# Patient Record
Sex: MALE | Race: WHITE | NOT HISPANIC OR LATINO | Employment: FULL TIME | URBAN - METROPOLITAN AREA
[De-identification: names, ages, dates, MRNs, and addresses within clinical notes are randomized per-mention and may not be internally consistent; named-entity substitution may affect disease eponyms.]

---

## 2017-02-14 ENCOUNTER — ALLSCRIPTS OFFICE VISIT (OUTPATIENT)
Dept: OTHER | Facility: OTHER | Age: 57
End: 2017-02-14

## 2017-02-14 DIAGNOSIS — K58.9 IRRITABLE BOWEL SYNDROME WITHOUT DIARRHEA: ICD-10-CM

## 2017-02-14 DIAGNOSIS — Z12.5 ENCOUNTER FOR SCREENING FOR MALIGNANT NEOPLASM OF PROSTATE: ICD-10-CM

## 2017-02-14 DIAGNOSIS — R73.01 IMPAIRED FASTING GLUCOSE: ICD-10-CM

## 2017-02-14 DIAGNOSIS — E78.5 HYPERLIPIDEMIA: ICD-10-CM

## 2017-02-14 DIAGNOSIS — F42.9 OBSESSIVE-COMPULSIVE DISORDER: ICD-10-CM

## 2017-02-14 DIAGNOSIS — H49.11 FOURTH NERVE PALSY OF RIGHT EYE: ICD-10-CM

## 2017-02-15 ENCOUNTER — GENERIC CONVERSION - ENCOUNTER (OUTPATIENT)
Dept: OTHER | Facility: OTHER | Age: 57
End: 2017-02-15

## 2017-02-15 ENCOUNTER — APPOINTMENT (OUTPATIENT)
Dept: LAB | Facility: CLINIC | Age: 57
End: 2017-02-15
Payer: COMMERCIAL

## 2017-02-15 DIAGNOSIS — R73.01 IMPAIRED FASTING GLUCOSE: ICD-10-CM

## 2017-02-15 DIAGNOSIS — K58.9 IRRITABLE BOWEL SYNDROME WITHOUT DIARRHEA: ICD-10-CM

## 2017-02-15 DIAGNOSIS — H49.11 FOURTH NERVE PALSY OF RIGHT EYE: ICD-10-CM

## 2017-02-15 DIAGNOSIS — Z12.5 ENCOUNTER FOR SCREENING FOR MALIGNANT NEOPLASM OF PROSTATE: ICD-10-CM

## 2017-02-15 DIAGNOSIS — E78.5 HYPERLIPIDEMIA: ICD-10-CM

## 2017-02-15 DIAGNOSIS — F42.9 OBSESSIVE-COMPULSIVE DISORDER: ICD-10-CM

## 2017-02-15 LAB
ALBUMIN SERPL BCP-MCNC: 3.6 G/DL (ref 3.5–5)
ALP SERPL-CCNC: 85 U/L (ref 46–116)
ALT SERPL W P-5'-P-CCNC: 39 U/L (ref 12–78)
ANION GAP SERPL CALCULATED.3IONS-SCNC: 8 MMOL/L (ref 4–13)
AST SERPL W P-5'-P-CCNC: 14 U/L (ref 5–45)
BASOPHILS # BLD AUTO: 0.02 THOUSANDS/ΜL (ref 0–0.1)
BASOPHILS NFR BLD AUTO: 0 % (ref 0–1)
BILIRUB SERPL-MCNC: 0.5 MG/DL (ref 0.2–1)
BUN SERPL-MCNC: 21 MG/DL (ref 5–25)
CALCIUM SERPL-MCNC: 8.5 MG/DL (ref 8.3–10.1)
CHLORIDE SERPL-SCNC: 105 MMOL/L (ref 100–108)
CHOLEST SERPL-MCNC: 167 MG/DL (ref 50–200)
CO2 SERPL-SCNC: 26 MMOL/L (ref 21–32)
CREAT SERPL-MCNC: 1.1 MG/DL (ref 0.6–1.3)
EOSINOPHIL # BLD AUTO: 0.29 THOUSAND/ΜL (ref 0–0.61)
EOSINOPHIL NFR BLD AUTO: 4 % (ref 0–6)
ERYTHROCYTE [DISTWIDTH] IN BLOOD BY AUTOMATED COUNT: 13.1 % (ref 11.6–15.1)
EST. AVERAGE GLUCOSE BLD GHB EST-MCNC: 123 MG/DL
GFR SERPL CREATININE-BSD FRML MDRD: >60 ML/MIN/1.73SQ M
GLUCOSE SERPL-MCNC: 115 MG/DL (ref 65–140)
HBA1C MFR BLD: 5.9 % (ref 4.2–6.3)
HCT VFR BLD AUTO: 44 % (ref 36.5–49.3)
HDLC SERPL-MCNC: 58 MG/DL (ref 40–60)
HGB BLD-MCNC: 15.2 G/DL (ref 12–17)
LDLC SERPL CALC-MCNC: 82 MG/DL (ref 0–100)
LYMPHOCYTES # BLD AUTO: 1.81 THOUSANDS/ΜL (ref 0.6–4.47)
LYMPHOCYTES NFR BLD AUTO: 28 % (ref 14–44)
MCH RBC QN AUTO: 30.2 PG (ref 26.8–34.3)
MCHC RBC AUTO-ENTMCNC: 34.5 G/DL (ref 31.4–37.4)
MCV RBC AUTO: 88 FL (ref 82–98)
MONOCYTES # BLD AUTO: 0.64 THOUSAND/ΜL (ref 0.17–1.22)
MONOCYTES NFR BLD AUTO: 10 % (ref 4–12)
NEUTROPHILS # BLD AUTO: 3.78 THOUSANDS/ΜL (ref 1.85–7.62)
NEUTS SEG NFR BLD AUTO: 58 % (ref 43–75)
NRBC BLD AUTO-RTO: 0 /100 WBCS
PLATELET # BLD AUTO: 253 THOUSANDS/UL (ref 149–390)
PMV BLD AUTO: 10.1 FL (ref 8.9–12.7)
POTASSIUM SERPL-SCNC: 3.9 MMOL/L (ref 3.5–5.3)
PROT SERPL-MCNC: 7.1 G/DL (ref 6.4–8.2)
PSA SERPL-MCNC: 1.4 NG/ML (ref 0–4)
RBC # BLD AUTO: 5.03 MILLION/UL (ref 3.88–5.62)
SODIUM SERPL-SCNC: 139 MMOL/L (ref 136–145)
TRIGL SERPL-MCNC: 137 MG/DL
TSH SERPL DL<=0.05 MIU/L-ACNC: 1.19 UIU/ML (ref 0.36–3.74)
WBC # BLD AUTO: 6.59 THOUSAND/UL (ref 4.31–10.16)

## 2017-02-15 PROCEDURE — 83036 HEMOGLOBIN GLYCOSYLATED A1C: CPT

## 2017-02-15 PROCEDURE — G0103 PSA SCREENING: HCPCS

## 2017-02-15 PROCEDURE — 84443 ASSAY THYROID STIM HORMONE: CPT

## 2017-02-15 PROCEDURE — 36415 COLL VENOUS BLD VENIPUNCTURE: CPT

## 2017-02-15 PROCEDURE — 80053 COMPREHEN METABOLIC PANEL: CPT

## 2017-02-15 PROCEDURE — 80061 LIPID PANEL: CPT

## 2017-02-15 PROCEDURE — 85025 COMPLETE CBC W/AUTO DIFF WBC: CPT

## 2017-02-21 ENCOUNTER — GENERIC CONVERSION - ENCOUNTER (OUTPATIENT)
Dept: OTHER | Facility: OTHER | Age: 57
End: 2017-02-21

## 2017-03-24 ENCOUNTER — ANESTHESIA EVENT (OUTPATIENT)
Dept: GASTROENTEROLOGY | Facility: HOSPITAL | Age: 57
End: 2017-03-24
Payer: COMMERCIAL

## 2017-03-25 ENCOUNTER — GENERIC CONVERSION - ENCOUNTER (OUTPATIENT)
Dept: OTHER | Facility: OTHER | Age: 57
End: 2017-03-25

## 2017-03-25 ENCOUNTER — HOSPITAL ENCOUNTER (OUTPATIENT)
Facility: HOSPITAL | Age: 57
Setting detail: OUTPATIENT SURGERY
Discharge: HOME | End: 2017-03-25
Attending: INTERNAL MEDICINE | Admitting: INTERNAL MEDICINE
Payer: COMMERCIAL

## 2017-03-25 ENCOUNTER — ANESTHESIA (OUTPATIENT)
Dept: GASTROENTEROLOGY | Facility: HOSPITAL | Age: 57
End: 2017-03-25
Payer: COMMERCIAL

## 2017-03-25 VITALS
SYSTOLIC BLOOD PRESSURE: 121 MMHG | OXYGEN SATURATION: 100 % | BODY MASS INDEX: 37.71 KG/M2 | HEIGHT: 64 IN | HEART RATE: 78 BPM | DIASTOLIC BLOOD PRESSURE: 82 MMHG | RESPIRATION RATE: 18 BRPM | WEIGHT: 220.9 LBS | TEMPERATURE: 97.1 F

## 2017-03-25 RX ORDER — TOPIRAMATE 25 MG/1
50 TABLET ORAL DAILY
COMMUNITY
End: 2021-06-30 | Stop reason: HOSPADM

## 2017-03-25 RX ORDER — ATORVASTATIN CALCIUM 20 MG/1
20 TABLET, FILM COATED ORAL DAILY
COMMUNITY
End: 2018-01-30 | Stop reason: SDUPTHER

## 2017-03-25 RX ORDER — SODIUM CHLORIDE, SODIUM LACTATE, POTASSIUM CHLORIDE, CALCIUM CHLORIDE 600; 310; 30; 20 MG/100ML; MG/100ML; MG/100ML; MG/100ML
100 INJECTION, SOLUTION INTRAVENOUS CONTINUOUS
Status: DISCONTINUED | OUTPATIENT
Start: 2017-03-25 | End: 2017-03-25 | Stop reason: HOSPADM

## 2017-03-25 RX ORDER — ASPIRIN 81 MG/1
81 TABLET ORAL DAILY
Status: ON HOLD | COMMUNITY
End: 2021-06-29 | Stop reason: ALTCHOICE

## 2017-03-25 RX ORDER — PROPOFOL 10 MG/ML
INJECTION, EMULSION INTRAVENOUS CONTINUOUS PRN
Status: DISCONTINUED | OUTPATIENT
Start: 2017-03-25 | End: 2017-03-25 | Stop reason: SURG

## 2017-03-25 RX ORDER — FLUOXETINE HYDROCHLORIDE 20 MG/1
20 CAPSULE ORAL DAILY
COMMUNITY
End: 2018-01-30 | Stop reason: SDUPTHER

## 2017-03-25 RX ORDER — LIDOCAINE HYDROCHLORIDE 10 MG/ML
INJECTION, SOLUTION INFILTRATION; PERINEURAL AS NEEDED
Status: DISCONTINUED | OUTPATIENT
Start: 2017-03-25 | End: 2017-03-25 | Stop reason: SURG

## 2017-03-25 RX ORDER — PHENTERMINE HYDROCHLORIDE 37.5 MG/1
37.5 CAPSULE ORAL EVERY MORNING
COMMUNITY
End: 2021-06-30 | Stop reason: HOSPADM

## 2017-03-25 RX ORDER — PROPOFOL 10 MG/ML
INJECTION, EMULSION INTRAVENOUS AS NEEDED
Status: DISCONTINUED | OUTPATIENT
Start: 2017-03-25 | End: 2017-03-25 | Stop reason: SURG

## 2017-03-25 RX ADMIN — LIDOCAINE HYDROCHLORIDE 20 MG: 10 INJECTION, SOLUTION INFILTRATION; PERINEURAL at 11:30

## 2017-03-25 RX ADMIN — SODIUM CHLORIDE, POTASSIUM CHLORIDE, SODIUM LACTATE AND CALCIUM CHLORIDE 100 ML/HR: 600; 310; 30; 20 INJECTION, SOLUTION INTRAVENOUS at 11:10

## 2017-03-25 RX ADMIN — PROPOFOL 100 MG: 10 INJECTION, EMULSION INTRAVENOUS at 11:30

## 2017-03-25 RX ADMIN — PROPOFOL 20 MG: 10 INJECTION, EMULSION INTRAVENOUS at 11:35

## 2017-03-25 RX ADMIN — PROPOFOL 80 MCG/KG/MIN: 10 INJECTION, EMULSION INTRAVENOUS at 11:30

## 2017-07-04 ENCOUNTER — OFFICE VISIT (OUTPATIENT)
Dept: URGENT CARE | Facility: MEDICAL CENTER | Age: 57
End: 2017-07-04
Payer: COMMERCIAL

## 2017-07-04 PROCEDURE — 99213 OFFICE O/P EST LOW 20 MIN: CPT

## 2018-01-10 NOTE — PROGRESS NOTES
Assessment    1  Encounter for preventive health examination (V70 0) (Z00 00)    Plan  CN IV nerve palsy, right eye, Hyperlipidemia, Impaired fasting glucose, Irritable bowel  syndrome, Obsessive compulsive disorder, Screening for prostate cancer    · (1) CBC/PLT/DIFF; Status:Active; Requested for:16Vnn4556;    · (1) COMPREHENSIVE METABOLIC PANEL; Status:Active; Requested for:21Ick4991;    · (1) HEMOGLOBIN A1C; Status:Active; Requested for:51Nsl5424;    · (1) LIPID PANEL, FASTING; Status:Active; Requested for:44Osk4562;    · (1) PSA (SCREEN) (Dx V76 44 Screen for Prostate Cancer); Status:Active; Requested  for:05Gek2185;    · (1) TSH; Status:Active; Requested for:23Qha1629;   Screening for colon cancer    · COLONOSCOPY; Status:Active; Requested for:67Koz2737;     Discussion/Summary  Impression: health maintenance visit  Currently, he eats an adequate diet and has an inadequate exercise regimen  Prostate cancer screening: PSA was ordered  Colorectal cancer screening: colonoscopy has been ordered  Screening lab work includes glucose and lipid profile  The risks and benefits of immunizations were discussed  He was advised to be evaluated by an ophthalmologist  Advice and education were given regarding nutrition, aerobic exercise, weight loss and cardiovascular risk reduction  Repeat labs  monitor BPs  follow up with weight management clinic  referral for colonoscopy  OV one year  History of Present Illness  HM, Adult Male: The patient is being seen for a health maintenance evaluation  The last health maintenance visit was >1 year(s) ago  Social History: Household members include spouse and adult children  He is   Work status: working full time  The patient has never smoked cigarettes  He reports occasional alcohol use  General Health: The patient's health since the last visit is described as good  He has regular dental visits  He denies vision problems   Vision care includes wearing glasses and an eye examination within the last year  He denies hearing loss  Lifestyle:  He consumes a diverse and healthy diet  He has weight concerns  He does not exercise regularly  He does not use tobacco    Screening: Prostate cancer screening includes last prostate-specific antigen testing 2013  Colorectal cancer screening includes no previous screening  Metabolic screening includes lipid profile performed within the past five years, glucose screening performed last year and thyroid function test performed last year  Cardiovascular risk factors: high LDL cholesterol and obesity  HPI: 64year old male here for wellness exam  active problems and PMH see chart  medications reviewed  labs 06/2014 see note  hyperlipidemia on Atorvastatin 20 mg daily  lipid profile 06/2014 cholesterol 155  TGs 119  HDL 46  LDL 85  [place] LFTs normal  impaired fasting glucose 06/2014   he is being followed by weight loss center  he has been started on Metformin along with Phentermine and generic Topamax  labs and abdominal u/s last year results not available  Review of Systems    Constitutional: recent 9 lb weight loss  Eyes: eyesight problems and right CN IV palsy  ophthalmology evaluation  MRI head no abnormalities  Lyme's titer normal  myasthenia testing negative symptoms improved  he is wearing prism lenses in his glasses  Cardiovascular: no chest pain, no palpitations and no extremity edema  Respiratory: no cough, no orthopnea, no shortness of breath during exertion and no PND  Gastrointestinal: no prior colonoscopy, but no abdominal pain, no nausea, no vomiting, no constipation, no diarrhea and no blood in stools  Genitourinary: 12/2013 0 7 , but no urinary hesitancy and no nocturia  Musculoskeletal: no arthralgias and no myalgias  Integumentary: no rashes and no skin lesions  Neurological: no headache and no dizziness  Psychiatric: OCD stable on Fluoxetine  Active Problems    1   CN IV nerve palsy, right eye (378 53) (H49 11)   2  Hyperlipidemia (272 4) (E78 5)   3  Impaired fasting glucose (790 21) (R73 01)   4  Irritable bowel syndrome (564 1) (K58 9)   5  Obesity (278 00) (E66 9)   6  Obsessive compulsive disorder (300 3) (F42 9)   7  Screening for prostate cancer (V76 44) (Z12 5)    Past Medical History    · History of Atypical chest pain (786 59) (R07 89)   · History of Cough (786 2) (R05)    Surgical History    · History of Cardiac Cath Procedure Summary    Family History  Mother    · Family history of Cardiomyopathy   · Family history of Hypertension (V17 49)  Sister    · Family history of Congenital Heart Defect  Brother    · Family history of Coronary Artery Disease (V17 49)  Family History    · Family history of Coronary Artery Disease (V17 49)    Social History    · Never smoked cigarettes (V49 89) (Z78 9)    Current Meds   1  Aspirin Low Strength 81 MG Oral Tablet Chewable; Therapy: (Recorded:21Nov2015) to Recorded   2  Atorvastatin Calcium 20 MG Oral Tablet; Take 1 tablet daily; Therapy: 21ATH9016 to (Last Rx:04Feb2017)  Requested for: 94XLH5694 Ordered   3  Atrovent HFA 17 MCG/ACT Inhalation Aerosol Solution; INHALE 2 PUFFS TWICE DAILY; Therapy: 49QZO9755 to (Last Rx:18Jun2016)  Requested for: 77SUH9923 Ordered   4  Benzonatate 200 MG Oral Capsule; TAKE 1 CAPSULE 3 TIMES DAILY AS NEEDED; Therapy: 51RZT0290 to (Evaluate:28Jun2016)  Requested for: 57SKE8975; Last   Rx:18Jun2016 Ordered   5  Flonase Allergy Relief 50 MCG/ACT Nasal Suspension; USE 1 SPRAY IN EACH   NOSTRIL TWICE DAILY; Therapy: 43RHF7315 to (Last Rx:21Nov2015)  Requested for: 21Nov2015 Ordered   6  FLUoxetine HCl - 20 MG Oral Capsule; take 3 capsules daily; Therapy: 02ERP4332 to (Last Rx:04Feb2017)  Requested for: 13CNK2952 Ordered   7  MetFORMIN HCl - 500 MG Oral Tablet; 1 tablet at bedtime Recorded   8  Phentermine HCl - 37 5 MG Oral Tablet; 1 TABLET DAILY Recorded   9  Topiramate 25 MG Oral Tablet;  Take 1 tablet twice daily Recorded    Allergies    1  No Known Drug Allergies    Vitals   Recorded: 05QAC5100 10:00AM   Temperature 97 6 F   Heart Rate 90   Respiration 16   Systolic 080   Diastolic 82   Height 5 ft 4 in   Weight 221 lb    BMI Calculated 37 93   BSA Calculated 2 05     Physical Exam    Constitutional   General appearance: No acute distress, well appearing and well nourished  Eyes   Conjunctiva and lids: No erythema, swelling or discharge  right CN IV palsy  Pupils and irises: Equal, round, reactive to light  Ophthalmoscopic examination: Normal fundi and optic discs  Ears, Nose, Mouth, and Throat   Otoscopic examination: Tympanic membranes translucent with normal light reflex  Canals patent without erythema  Oropharynx: Normal with no erythema, edema, exudate or lesions  Neck   Neck: Supple, symmetric, trachea midline, no masses  Thyroid: Normal, no thyromegaly  There were no thyroid nodules  Pulmonary   Auscultation of lungs: Clear to auscultation  Cardiovascular   Auscultation of heart: Normal rate and rhythm, normal S1 and S2, no murmurs  Heart sounds: no gallop heard  Carotid pulses: 2+ bilaterally  no bruit heard over the right carotid and no bruit heard over the left carotid  Abdominal aorta: Normal   Abdominal aorta: no bruit heard  Examination of extremities for edema and/or varicosities: Normal     Abdomen   Abdomen: Non-tender, no masses  Liver and spleen: No hepatomegaly or splenomegaly  Lymphatic   Palpation of lymph nodes in neck: No lymphadenopathy  no anterior cervical node enlargement, no posterior cervical node enlargement, no submandibular node enlargement and no supraclavicular node enlargement  Musculoskeletal   Gait and station: Normal     Skin   Skin and subcutaneous tissue: Normal without rashes or lesions      Psychiatric   Mood and affect: Normal        Results/Data  PHQ-2 Adult Depression Screening 54BMQ5297 10:11AM User, [employer]     Test Name Result Flag Reference   PHQ-2 Adult Depression Score 0     Over the last two weeks, how often have you been bothered by any of the following problems?   Little interest or pleasure in doing things: Not at all - 0  Feeling down, depressed, or hopeless: Not at all - 0   PHQ-2 Adult Depression Screening Negative       (1) CBC/PLT/DIFF 25IYQ1579 08:36AM Noe David     Test Name Result Flag Reference   DIFFERENTIAL METHOD Automated     WBC COUNT 6 05 Thousand/uL  4 31-10 16   RBC COUNT 4 62 Million/uL  3 88-5 62   HEMOGLOBIN 13 6 g/dL  12 0-17 0   HEMATOCRIT 40 8 %  36 5-49 3   MCV 88 fL  82-98   MCH 29 4 pg  26 8-34 3   MCHC 33 3 g/dL  31 4-37 4   RDW 13 7 %  11 6-15 1   MPV 9 5 fL  8 9-12 7   PLATELET COUNT 690 Thousand/uL  149-390   nRBC AUTOMATED 0 /100WBC     NEUTROPHILS RELATIVE PERCENT 51 %  43-75   LYMPHOCYTES RELATIVE PERCENT 35 %  14-44   MONOCYTES RELATIVE PERCENT 9 %  4-12   EOSINOPHILS RELATIVE PERCENT 4 %  0-6   BASOPHILS RELATIVE PERCENT 1 %  0-1   NEUTROPHILS ABSOLUTE COUNT 3 09 Thousand/uL  1 85-7 62   LYMPHOCYTES ABSOLUTE COUNT 2 12 Thousand/uL  0 60-4 47   MONOCYTES ABSOLUTE COUNT 0 54 Thousand/uL  0 17-1 22   EOSINOPHILS ABSOLUTE COUNT 0 24 Thousand/uL  0 00-0 61   BASOPHILS ABSOLUTE COUNT 0 06 Thousand/uL  0 00-0 10     (1) COMPREHENSIVE METABOLIC PANEL 09SRJ8507 45:48WT Noe David     Test Name Result Flag Reference   SODIUM 139 mmol/L  136-145   POTASSIUM 4 2 mmol/L  3 5-5 3   CHLORIDE 104 mmol/L  100-108   CARBON DIOXIDE 26 mmol/L  23-33   ANION GAP (CALC) 9 mmol/L  4-13   BILI, TOTAL 0 45 mg/dL  0 20-1 00   TOTAL PROTEIN 6 9 g/dL  6 4-8 2   ALK PHOSPHATAS 114 U/L     ALT (SGPT) 66 U/L  6-78   AST(SGOT) 30 U/L  0-45   GLUCOSE,RANDM 105 mg/dL     ALBUMIN 3 6 g/dL  3 5-5 0   BLOOD UREA NITROGEN 17 mg/dL  5-25   CALCIUM 9 0 mg/dL  8 3-10 1   CREATININE 0 94 mg/dL  0 60-1 30   eGFR African American >60 ml/min/1 73sqm     eGFR Non-African American >60 ml/min/1 73sqm       (1) LIPID PANEL, FASTING 23OCH6070 08:36AM Tamara Knee     Test Name Result Flag Reference   TRIGLYCERIDES 119 mg/dL     TRIGLYCERIDE:  Normal              <150 mg/dl  Borderline High    150-199 mg/dl  High               200-499 mg/dl  Very High          >499 mg/dl  _______________________________________   CHOLESTEROL 155 mg/dL     CHOLESTEROL:  Desirable        <200 mg/dl  Borderline High  200-239 mg/dl  High             >239 mg/dl  ____________________________________   HDL,DIRECT 46 mg/dL     HDL:  High       >59 mg/dl  Low        <41 mg/dl  ______________________________   LDL CHOLESTEROL CALCULATED 85 mg/dL  0-100   LDL, CALCULATED:  This screening LDL is a calculated result  It does not have the accuracy of the Direct Measured LDL in the  monitoring of patients with hyperlipidemia and/or statin therapy  Direct Measured LDL (Test Code 9980) must be ordered separately in these  patients   ______________________________     (1) PSA (SCREEN) (Dx V76 44 Screen for Prostate Cancer) 60MWP1443 09:17AM Tamara Joana     Test Name Result Flag Reference   PSA 0 7 ng/mL   0-4 0   PSA values from one laboratory may not be comparable to those from  another because of the various testing technologies employed  Additionally, PSA results can not be interpreted as absolute evidence of  the presence or absence of disease  This PSA value was obtained by [employer] Chemiluminometric Immunoassay  Future Appointments    Date/Time Provider Specialty Site   02/15/2018 08:00 AM SUSHILA Perez   Family Medicine [address] [facility]     Signatures   Electronically signed by : SUSHILA Gallardo ; Feb 14 2017 11:01AM EST                       (Author)

## 2018-01-11 NOTE — MISCELLANEOUS
Message     Recorded as Task   Date: 02/17/2017 01:36 PM, Created By: Jovany Madrigal   Task Name: Intake   Assigned To: SHANTA De Santiago,SUMEET TEAM   Regarding Patient: Omar Alvarado, Status: In Progress   Comment:    Vangie Torres - 17 Feb 2017 1:36 PM     TASK CREATED  Date: 2/17/2017  Screened by: Erik Snider    Referring Provider: Dr Wojciech Sims:   Has patient been referred for a routine screening Colonoscopy? Yes   Is the patient over 48years of age? Yes     If the answer is YES to both questions, proceed to the medical questions  Do you have a family history of colon cancer? No    Do you have a personal history of colon polyps? No     Do you have any of the following symptoms? No    Have you had a coronary or vascular stent within the last year? No    Have you had a heart attack or stroke in the last 6 months? No    Have you had intestinal surgery in the last 3 months? No    Do you have problems with:   sleep apnea No  Do you use:   No    Have you been hospitalized in the last Month? No    Have you been diagnosed with a bleeding disorder or anemia? No    Have you had chest pain (angina) or breathing problems  (COPD) in the last 3 months? No    Do you have any difficulty walking up a flight of stairs? No    Have you had Kidney failure or insufficiency? No    Have you had heart valve surgery? No    Are you confined to a wheelchair? No     Do you take,,,,, or other blood thinning medication? No    Have you been diagnosed with Diabetes or are you taking any   Diabetic medications? Yes Ok pt takes Metformin    : If patient answers NO to medical questions, then schedule procedure  If patient answers YES to medical questions, then schedule office appointment  Previous Colonoscopy ( if yes) yes  Date and Facility of last colonoscopy?  27 yrs ago    Patient scheduled for procedure: Arley Zhou   ]  Scheduled by: Ashely Truong   ]  Date: [03/25/2017  ]  Time: [ ]  Provider: [ dr Luis Carlos Lucas  ]  [facility]    Referral Obtained for procedure: Were instructions Mailed? Was the prep sent to Pharmacy? Comments: pt would like to have procedure done at [facility]  Pt can be [phone]   DebMarielos - 17 Feb 2017 3:55 PM     TASK REASSIGNED: Previously Assigned To Ewa Parkinson - 21 Feb 2017 3:36 PM     TASK IN PROGRESS        Active Problems    1  CN IV nerve palsy, right eye (378 53) (H49 11)   2  Hyperlipidemia (272 4) (E78 5)   3  Impaired fasting glucose (790 21) (R73 01)   4  Irritable bowel syndrome (564 1) (K58 9)   5  Obesity (278 00) (E66 9)   6  Obsessive compulsive disorder (300 3) (F42 9)   7  Screening for colon cancer (V76 51) (Z12 11)   8  Screening for prostate cancer (V76 44) (Z12 5)    Current Meds   1  Aspirin Low Strength 81 MG Oral Tablet Chewable; Therapy: (Recorded:21Nov2015) to Recorded   2  Atorvastatin Calcium 20 MG Oral Tablet; Take 1 tablet daily; Therapy: 17NSK8588 to (Last Rx:00Kcg7814)  Requested for: 47VSN3453 Ordered   3  FLUoxetine HCl - 20 MG Oral Capsule; take 3 capsules daily; Therapy: 20CNC9620 to (Last Rx:35Hjh6565)  Requested for: 60QGU5194 Ordered   4  MetFORMIN HCl - 500 MG Oral Tablet; 1 tablet at bedtime Recorded   5  Phentermine HCl - 37 5 MG Oral Tablet; 1 TABLET DAILY Recorded   6  Topiramate 25 MG Oral Tablet; Take 1 tablet twice daily Recorded    Allergies    1   No Known Drug Allergies    Signatures   Electronically signed by : Juanita Garza, ; Feb 21 2017  3:53PM EST                       (Author)

## 2018-01-12 NOTE — RESULT NOTES
Message  Jake Challenger I have enclosed a copy of your recent labs  all results are normal except for fasting blood sugar 115  normal less than 100  borderline 100 to 125  watch diet  Results/Data     (1) LIPID PANEL, FASTING   CHOLESTEROL: 167 mg/dL Reference Range   TRIGLYCERIDES: 137 mg/dL Reference Range <=150  HDL,DIRECT: 58 mg/dL Reference Range 40-60  LDL CHOLESTEROL CALCULATED: 82 mg/dL Reference Range 0-100  (1) CBC/PLT/DIFF   WBC COUNT: 6 59 Thousand/uL Reference Range 4 31-10 16  RBC COUNT: 5 03 Million/uL Reference Range 3 88-5 62  HEMOGLOBIN: 15 2 g/dL Reference Range 12 0-17 0  HEMATOCRIT: 44 0 % Reference Range 36 5-49 3  MCV: 88 fL Reference Range 82-98  MCH: 30 2 pg Reference Range 26 8-34 3  MCHC: 34 5 g/dL Reference Range 31 4-37 4  RDW: 13 1 % Reference Range 11 6-15 1  MPV: 10 1 fL Reference Range 8 9-12 7  PLATELET COUNT: 288 Thousands/uL Reference Range 149-390  nRBC AUTOMATED: 0 /100 WBCs  NEUTROPHILS RELATIVE PERCENT: 58 % Reference Range 43-75  LYMPHOCYTES RELATIVE PERCENT: 28 % Reference Range 14-44  MONOCYTES RELATIVE PERCENT: 10 % Reference Range 4-12  EOSINOPHILS RELATIVE PERCENT: 4 % Reference Range 0-6  BASOPHILS RELATIVE PERCENT: 0 % Reference Range 0-1  NEUTROPHILS ABSOLUTE COUNT: 3 78 Thousands/?L Reference Range 1 85-7 62  LYMPHOCYTES ABSOLUTE COUNT: 1 81 Thousands/?L Reference Range 0 60-4 47  MONOCYTES ABSOLUTE COUNT: 0 64 Thousand/?L Reference Range 0 17-1 22  EOSINOPHILS ABSOLUTE COUNT: 0 29 Thousand/?L Reference Range 0 00-0 61  BASOPHILS ABSOLUTE COUNT: 0 02 Thousands/?L Reference Range 0 00-0 10  (1) PSA (SCREEN) (Dx V76 44 Screen for Prostate Cancer)   PROSTATE SPECIFIC ANTIGEN: 1 4 ng/mL Reference Range 0 0-4 0  (1) HEMOGLOBIN A1C   HEMOGLOBIN A1C: 5 9 % Reference Range 4 2-6 3  EST  AVG   GLUCOSE: 123 mg/dl  (1) COMPREHENSIVE METABOLIC PANEL   SODIUM: 787 mmol/L Reference Range 136-145  POTASSIUM: 3 9 mmol/L Reference Range 3 5-5 3  CHLORIDE: 105 mmol/L Reference Range 100-108  CARBON DIOXIDE: 26 mmol/L Reference Range 21-32  ANION GAP (CALC): 8 mmol/L Reference Range 4-13  BLOOD UREA NITROGEN: 21 mg/dL Reference Range 5-25  CREATININE: 1 10 mg/dL Reference Range 0 60-1 30  GLUCOSE,RANDM: 115 mg/dL Reference Range   CALCIUM: 8 5 mg/dL Reference Range 8 3-10 1  AST(SGOT): 14 U/L Reference Range 5-45  ALT (SGPT): 39 U/L Reference Range 12-78  ALK PHOSPHATAS: 85 U/L Reference Range   TOTAL PROTEIN: 7 1 g/dL Reference Range 6 4-8 2  ALBUMIN: 3 6 g/dL Reference Range 3 5-5 0  BILI, TOTAL: 0 50 mg/dL Reference Range 0 20-1 00  eGFR Non-: >60 0 ml/min/1 73sq m  (1) TSH   TSH: 1 190 uIU/mL Reference Range 0 358-3 740    Signatures   Electronically signed by : SUSHILA Batista ; Feb 15 2017  9:57PM EST                       (Author)

## 2018-01-13 VITALS
TEMPERATURE: 97.6 F | BODY MASS INDEX: 37.73 KG/M2 | RESPIRATION RATE: 16 BRPM | HEIGHT: 64 IN | SYSTOLIC BLOOD PRESSURE: 130 MMHG | DIASTOLIC BLOOD PRESSURE: 82 MMHG | HEART RATE: 90 BPM | WEIGHT: 221 LBS

## 2018-01-18 NOTE — MISCELLANEOUS
Message  Notified patient of positive influenza A test result        Plan  Influenza-like illness    · Benzonatate 200 MG Oral Capsule; TAKE 1 CAPSULE 3 TIMES DAILY AS  NEEDED   · (1) RAPID INFLUENZA SCREEN with reflex to PCR; Status:Resulted - Requires  Verification;   Done: 83QFV4658 10:03AM    Signatures   Electronically signed by : SUMA Christie; Dec 22 2016  2:02PM EST                       (Author)

## 2018-01-19 NOTE — PROGRESS NOTES
Assessment   1  Cellulitis (022 9) (L03 90)    Discussion/Summary   Discussion Summary:    Take antibiotic as directed: eat yogurt to avoid GI upset  for increasing signs of infection  Medication Side Effects Reviewed: Possible side effects of new medications were reviewed with the patient/guardian today  Understands and agrees with treatment plan: The treatment plan was reviewed with the patient/guardian  The patient/guardian understands and agrees with the treatment plan    Counseling Documentation With Imm: The patient was counseled regarding diagnostic results,-- instructions for management,-- risk factor reductions,-- prognosis,-- patient and family education,-- impressions,-- risks and benefits of treatment options,-- importance of compliance with treatment  Follow Up Instructions: Follow Up with your Primary Care Provider in 1-2 days  If your symptoms worsen, go to the nearest [place] [address] Emergency Department  Chief Complaint   1  Skin Wound  Chief Complaint Free Text Note Form: Presents with insect bite right shin for last 2 days  Unsure if it is a tick  Red this am       History of Present Illness   HPI: This is a 65 y/o M c/o abrasion on R LE  Pt repots erythema warmth to touch and tenderness  Pt denies any fever/chills, n/v, d/c    Hospital Based Practices Required Assessment:      Pain Assessment      the patient states they do not have pain  Abuse And Domestic Violence Screen       Yes, the patient is safe at home  -- The patient states no one is hurting them  Depression And Suicide Screen  No, the patient has not had thoughts of hurting themself  No, the patient has not felt depressed in the past 7 days  Prefered Language is  [place]  Primary Language is  English  Readiness To Learn: Receptive  Barriers To Learning: none        Preferred Learning: verbal      Review of Systems   Focused-Male:      Constitutional: no fever or chills, feels well, no tiredness, no recent weight loss or weight gain  ENT: no complaints of earache, no loss of hearing, no nosebleeds or nasal discharge, no sore throat or hoarseness  Cardiovascular: no complaints of slow or fast heart rate, no chest pain, no palpitations, no leg claudication or lower extremity edema  Respiratory: no complaints of shortness of breath, no wheezing or cough, no dyspnea on exertion, no orthopnea or PND  Gastrointestinal: no complaints of abdominal pain, no constipation, no nausea or vomiting, no diarrhea or bloody stools  Genitourinary: no complaints of dysuria or incontinence, no hesitancy, no nocturia, no genital lesion, no inadequacy of penile erection  Musculoskeletal: no complaints of arthralgia, no myalgia, no joint swelling or stiffness, no limb pain or swelling  Integumentary: no complaints of skin rash or lesion, no itching or dry skin, no skin wounds-- and-- as noted in HPI  Neurological: no complaints of headache, no confusion, no numbness or tingling, no dizziness or fainting  Active Problems   1  CN IV nerve palsy, right eye (378 53) (H49 11)   2  Hyperlipidemia (272 4) (E78 5)   3  Impaired fasting glucose (790 21) (R73 01)   4  Irritable bowel syndrome (564 1) (K58 9)   5  Obesity (278 00) (E66 9)   6  Obsessive compulsive disorder (300 3) (F42 9)   7  Screening for colon cancer (V76 51) (Z12 11)   8  Screening for prostate cancer (V76 44) (Z12 5)    Past Medical History   1  History of Atypical chest pain (786 59) (R07 89)   2  History of Cough (786 2) (R05)  Active Problems And Past Medical History Reviewed: The active problems and past medical history were reviewed and updated today  Family History   Mother    1  Family history of Cardiomyopathy   2  Family history of Hypertension (V17 49)  Sister    3  Family history of Congenital Heart Defect  Brother    4  Family history of Coronary Artery Disease (V17 49)  Family History    5  Family history of Coronary Artery Disease (V17 49)  Family History Reviewed: The family history was reviewed and updated today  Social History    · Never smoked cigarettes (V49 89) (Z78 9)  Social History Reviewed: The social history was reviewed and updated today  Surgical History   1  History of Cardiac Cath Procedure Summary  Surgical History Reviewed: The surgical history was reviewed and updated today  Current Meds    1  Aspirin Low Strength 81 MG Oral Tablet Chewable; Therapy: (Recorded:21Nov2015) to Recorded   2  Atorvastatin Calcium 20 MG Oral Tablet; Take 1 tablet daily; Therapy: 67DNH7334 to (Last Rx:02Ysk1514)  Requested for: 33KZM9036 Ordered   3  FLUoxetine HCl - 20 MG Oral Capsule; take 3 capsules daily; Therapy: 23MWY0453 to (Last Rx:22Odn2384)  Requested for: 60CNF7404 Ordered   4  Suprep Bowel Prep Kit 17 5-3 13-1 6 GM/180ML Oral Solution; USE AS DIRECTED; Therapy: 83Eup7159 to (Last Rx:10Nou4941)  Requested for: 17Wxn0689 Ordered  Medication List Reviewed: The medication list was reviewed and updated today  Allergies   1  No Known Drug Allergies    Vitals   Signs   Recorded: 75ZOL3339 09:11AM   Temperature: 98 2 F, Temporal  Heart Rate: 74  Pulse Quality: Normal  Respiration Quality: Normal  Respiration: 20  Systolic: 366, Sitting  Diastolic: 98, Sitting  Weight: 230 lb   BMI Calculated: 39 48  BSA Calculated: 2 08  O2 Saturation: 98, RA    Physical Exam        Constitutional      General appearance: No acute distress, well appearing and well nourished  Skin      Examination of the skin for lesions: Abnormal  -- RLE: eyrthema, edema, warmth to touch  Future Appointments      Date/Time Provider Specialty Site   02/15/2018 08:00 AM SUSHILA Guadarrama   Family Medicine [facility]     Signatures    Electronically signed by : Antonio Shi [facility]; Jan 18 2018  4:58PM EST                       (Author)     Electronically signed by : MK Lubin ; Jan 18 2018  5:40PM EST                       (Co-author)

## 2018-01-30 DIAGNOSIS — F32.A DEPRESSION, UNSPECIFIED DEPRESSION TYPE: ICD-10-CM

## 2018-01-30 DIAGNOSIS — E78.2 MIXED HYPERLIPIDEMIA: Primary | ICD-10-CM

## 2018-01-30 RX ORDER — FLUOXETINE HYDROCHLORIDE 20 MG/1
CAPSULE ORAL
Qty: 270 CAPSULE | Refills: 3 | Status: SHIPPED | OUTPATIENT
Start: 2018-01-30

## 2018-01-30 RX ORDER — ATORVASTATIN CALCIUM 20 MG/1
TABLET, FILM COATED ORAL
Qty: 90 TABLET | Refills: 3 | Status: SHIPPED | OUTPATIENT
Start: 2018-01-30

## 2020-08-11 ENCOUNTER — TELEPHONE (OUTPATIENT)
Dept: NEUROLOGY | Facility: CLINIC | Age: 60
End: 2020-08-11

## 2020-08-11 NOTE — TELEPHONE ENCOUNTER
called pt advised t hat the order that we recived is for [facility] pt needs to get an order for [facility] Neurology

## 2020-08-12 ENCOUNTER — TELEPHONE (OUTPATIENT)
Dept: NEUROLOGY | Facility: CLINIC | Age: 60
End: 2020-08-12

## 2020-08-12 NOTE — TELEPHONE ENCOUNTER
1rst attemped call pt to schedule appr   Amanda Carreno/Motor  [facility] Disorder/[employer] [facility]/ schedule appt with Jenny Chandler

## 2020-09-03 ENCOUNTER — CONSULT (OUTPATIENT)
Dept: NEUROLOGY | Facility: CLINIC | Age: 60
End: 2020-09-03
Payer: COMMERCIAL

## 2020-09-03 VITALS
BODY MASS INDEX: 39.76 KG/M2 | SYSTOLIC BLOOD PRESSURE: 122 MMHG | TEMPERATURE: 98 F | HEIGHT: 63 IN | HEART RATE: 75 BPM | DIASTOLIC BLOOD PRESSURE: 73 MMHG

## 2020-09-03 DIAGNOSIS — F42.9 OBSESSIVE-COMPULSIVE DISORDER, UNSPECIFIED TYPE: ICD-10-CM

## 2020-09-03 DIAGNOSIS — G24.5 BLEPHAROSPASM OF BOTH EYES: Primary | ICD-10-CM

## 2020-09-03 PROCEDURE — 99244 OFF/OP CNSLTJ NEW/EST MOD 40: CPT | Performed by: PSYCHIATRY & NEUROLOGY

## 2020-09-03 RX ORDER — TRIHEXYPHENIDYL HYDROCHLORIDE 2 MG/1
TABLET ORAL
Qty: 60 TABLET | Refills: 2 | Status: SHIPPED | OUTPATIENT
Start: 2020-09-03 | End: 2020-10-22 | Stop reason: SDUPTHER

## 2020-09-03 RX ORDER — OMEPRAZOLE 20 MG/1
CAPSULE, DELAYED RELEASE ORAL AS NEEDED
COMMUNITY
Start: 2020-07-25 | End: 2021-06-30 | Stop reason: HOSPADM

## 2020-09-03 NOTE — PATIENT INSTRUCTIONS
Eye blinking and spasms which clinically appears more like blepharospasm versus late onset motor tic  Will try medication trial with trihexyphenidyl 2mg tab 1/2 tab x 1 day then  1 tab daily x 1 week then 1 tab bid  Call if you develop side effects or inefffective after 4 weeks  Will obtain MRI of the brain to rule out underlying cause and follow up after to discuss and assess response

## 2020-09-03 NOTE — PROGRESS NOTES
Patient ID: Pao Pagan is a 61 y o  male  Assessment/Plan:    Blepharospasm of both eyes  Eye blinking and spasms which clinically appears more like blepharospasm versus late onset motor tic  Will try medication trial with trihexyphenidyl 2mg tab 1/2 tab x 1 day then  1 tab daily x 1 week then 1 tab bid  Call if you develop side effects or inefffective after 4 weeks  Will obtain MRI of the brain to rule out underlying cause and follow up after to discuss and assess response  Prior drumming movement in the setting of recovering from anesthesia  Perhaps stereotypy at the time, related to OCD  Diagnoses and all orders for this visit:    Blepharospasm of both eyes  -     MRI brain with and without contrast; Future  -     trihexyphenidyl (ARTANE) 2 mg tablet; 1/2 tab qam x 1 day then 1 tab daily x 1 week then 1 tab bid    Obsessive-compulsive disorder, unspecified type    Other orders  -     omeprazole (PriLOSEC) 20 mg delayed release capsule; as needed  -     Cyanocobalamin (VITAMIN B 12 PO); Take 1 tablet by mouth  -     Multiple Vitamins-Iron (MULTIVITAMIN/IRON PO); Take by mouth 2 (two) times a day  -     Calcium Carb-Cholecalciferol (CALCIUM CARBONATE-VITAMIN D3 PO); Take by mouth Three times a day           time  Subjective:    Mr Shiela Schmid is a man with OCD, hyperlipidema, PTSD, hx of bladder cancer undergoing treatment who is referred to the Movement disorder center for evaluation of frequent blinking tics  He gradually developed frequent eye blinking and spasms in April/May 2020  Overtime this has gradually worsened to the point that is now present much of the day  He underwent a procedure for recent diagnosis of bladder cancer 8/6/2020 and woke up from anesthesia with severe blinking and drumming of his hands on his abdomen  He was given fentanyl and two other medications  Drumming stopped but eye blinking did not improve  He was fully aware and conscious during event   He has good recollection of the events and bought in a video to show me the movements  Blinking is mild in am and worse as the day goes on  He has not tried any medications  He is not aware of any sensory tricks  No photophobia  He denies any premonitory sensation or release with movements  He can suppress blinking for short periods of time if he really tries to concentrate on this  There is no history of frequent blinking or motor tics as a child  Nothing makes it worse or better  It is now moderate in severity and bothersome  Fluoextine for 18 years for PCD has been controlled  Dx in his 29's   He has "night tremors", wife feels him shaking  Present for over a year  No associated incontinence  Left hand tremor on occasion while driving  Family history of "undiagnosed tics" described as some with eye twiching ( cousins and uncle)  The following portions of the patient's history were reviewed and updated as appropriate: allergies, current medications, past family history, past medical history, past social history, past surgical history and problem list          Objective:    Blood pressure 122/73, pulse 75, temperature 98 °F (36 7 °C), height 5' 2 5" (1 588 m)  Physical Exam  Vitals signs reviewed  Eyes:      Pupils: Pupils are equal, round, and reactive to light  Pulmonary:      Effort: Pulmonary effort is normal    Musculoskeletal:      Right lower leg: No edema  Left lower leg: No edema  Neurological:      Deep Tendon Reflexes: Strength normal and reflexes are normal and symmetric  Psychiatric:         Speech: Speech normal          Neurological Exam  Mental Status   Oriented to person, place, time and situation  Recent and remote memory are intact  Speech is normal  Follows complex commands  Attention and concentration are normal     Cranial Nerves  CN II: Visual fields full to confrontation  Right funduscopic exam: not visualized  Left funduscopic exam: not visualized    CN III, IV, VI: Pupils equal round and reactive to light bilaterally  Right CN IV palsy  CN V: Facial sensation is normal   CN VII: Full and symmetric facial movement  CN VIII: Hearing is normal   CN IX, X: Palate elevates symmetrically  CN XI: Shoulder shrug strength is normal   CN XII: Tongue midline without atrophy or fasciculations  Constant blinking  Of the eyes with spasm  Movements can be suppressed for a short periods with effort  No sensory trick  No lower facial movements  Involvement of the obicularis oculi and  muscles       Motor   Normal muscle tone  The following abnormal movements were seen: Strength is 5/5 throughout all four extremities  Sensory  Light touch is normal in upper and lower extremities  Pinprick is normal in upper and lower extremities  Vibration is normal in upper and lower extremities  Reflexes  Deep tendon reflexes are 2+ and symmetric in all four extremities with downgoing toes bilaterally  Coordination  Right: Finger-to-nose normal  Rapid alternating movement normal   Left: Finger-to-nose normal  Rapid alternating movement normal     Gait  Casual gait is normal including stance, stride, and arm swing  Able to rise from chair without using arms  ROS:    Review of Systems   Constitutional: Positive for fatigue (due to Cancer)  Negative for appetite change and fever  HENT: Negative  Negative for hearing loss, tinnitus, trouble swallowing and voice change  Eyes: Negative  Negative for photophobia and pain  Respiratory: Negative  Negative for shortness of breath  Cardiovascular: Negative  Negative for palpitations  Gastrointestinal: Negative  Negative for nausea and vomiting  Endocrine: Negative  Negative for cold intolerance  Genitourinary: Negative  Negative for dysuria, frequency and urgency  Musculoskeletal: Negative  Negative for myalgias and neck pain  Skin: Negative  Negative for rash  Allergic/Immunologic: Negative      Neurological: Positive for tremors  Negative for dizziness, seizures, syncope, facial asymmetry, speech difficulty, weakness, light-headedness, numbness and headaches  Twitching  Convulsions     Hematological: Negative  Does not bruise/bleed easily  Psychiatric/Behavioral: Positive for sleep disturbance (Waking up at night)  Negative for confusion and hallucinations  All other systems reviewed and are negative  Review of system was personally reviewed

## 2020-09-04 PROBLEM — G24.5 BLEPHAROSPASM OF BOTH EYES: Status: ACTIVE | Noted: 2020-09-04

## 2020-09-04 NOTE — ASSESSMENT & PLAN NOTE
Eye blinking and spasms which clinically appears more like blepharospasm versus late onset motor tic  Will try medication trial with trihexyphenidyl 2mg tab 1/2 tab x 1 day then  1 tab daily x 1 week then 1 tab bid  Call if you develop side effects or inefffective after 4 weeks  Will obtain MRI of the brain to rule out underlying cause and follow up after to discuss and assess response  Prior drumming movement in the setting of recovering from anesthesia  Perhaps stereotypy at the time, related to OCD

## 2020-09-27 ENCOUNTER — HOSPITAL ENCOUNTER (OUTPATIENT)
Dept: MRI IMAGING | Facility: HOSPITAL | Age: 60
Discharge: HOME | End: 2020-09-27
Attending: PSYCHIATRY & NEUROLOGY
Payer: COMMERCIAL

## 2020-09-27 DIAGNOSIS — G24.5 BLEPHAROSPASM OF BOTH EYES: ICD-10-CM

## 2020-09-27 PROCEDURE — A9585 GADOBUTROL INJECTION: HCPCS | Performed by: PSYCHIATRY & NEUROLOGY

## 2020-09-27 PROCEDURE — G1004 CDSM NDSC: HCPCS

## 2020-09-27 PROCEDURE — 70553 MRI BRAIN STEM W/O & W/DYE: CPT

## 2020-09-27 RX ADMIN — GADOBUTROL 10 ML: 604.72 INJECTION INTRAVENOUS at 11:35

## 2020-10-20 ENCOUNTER — TELEPHONE (OUTPATIENT)
Dept: NEUROLOGY | Facility: CLINIC | Age: 60
End: 2020-10-20

## 2020-10-22 ENCOUNTER — OFFICE VISIT (OUTPATIENT)
Dept: NEUROLOGY | Facility: CLINIC | Age: 60
End: 2020-10-22
Payer: COMMERCIAL

## 2020-10-22 ENCOUNTER — TELEPHONE (OUTPATIENT)
Dept: NEUROLOGY | Facility: CLINIC | Age: 60
End: 2020-10-22

## 2020-10-22 VITALS
HEART RATE: 70 BPM | TEMPERATURE: 97.8 F | HEIGHT: 63 IN | BODY MASS INDEX: 33.2 KG/M2 | WEIGHT: 187.4 LBS | DIASTOLIC BLOOD PRESSURE: 70 MMHG | SYSTOLIC BLOOD PRESSURE: 118 MMHG

## 2020-10-22 DIAGNOSIS — G24.5 BLEPHAROSPASM OF BOTH EYES: ICD-10-CM

## 2020-10-22 DIAGNOSIS — G24.5 BLEPHAROSPASM OF BOTH EYES: Primary | ICD-10-CM

## 2020-10-22 PROCEDURE — 99214 OFFICE O/P EST MOD 30 MIN: CPT | Performed by: PSYCHIATRY & NEUROLOGY

## 2020-10-22 RX ORDER — TRIHEXYPHENIDYL HYDROCHLORIDE 2 MG/1
2 TABLET ORAL DAILY
Qty: 30 TABLET | Refills: 8 | Status: SHIPPED | OUTPATIENT
Start: 2020-10-22 | End: 2021-04-28 | Stop reason: SINTOL

## 2021-03-10 DIAGNOSIS — Z23 ENCOUNTER FOR IMMUNIZATION: ICD-10-CM

## 2021-04-28 ENCOUNTER — TELEPHONE (OUTPATIENT)
Dept: NEUROLOGY | Facility: CLINIC | Age: 61
End: 2021-04-28

## 2021-04-28 ENCOUNTER — OFFICE VISIT (OUTPATIENT)
Dept: NEUROLOGY | Facility: CLINIC | Age: 61
End: 2021-04-28
Payer: COMMERCIAL

## 2021-04-28 VITALS
SYSTOLIC BLOOD PRESSURE: 122 MMHG | DIASTOLIC BLOOD PRESSURE: 77 MMHG | WEIGHT: 194 LBS | BODY MASS INDEX: 34.92 KG/M2 | HEART RATE: 70 BPM

## 2021-04-28 DIAGNOSIS — G24.5 BLEPHAROSPASM OF BOTH EYES: Primary | ICD-10-CM

## 2021-04-28 PROCEDURE — 99214 OFFICE O/P EST MOD 30 MIN: CPT | Performed by: PSYCHIATRY & NEUROLOGY

## 2021-04-28 NOTE — ASSESSMENT & PLAN NOTE
Eye blinking and spasms with partial improvement on trihexyphenidyl but he was unable to tolerate this  Time spent discussing the treatment of blood for spasm  We discussed the potential use of neurotoxin injections such as Botox or Xeomin  He was treatment will last about 3 months at a time  We would start off with lower doses with plans to increase if ineffective  Potential risks and benefits discussed  Here is aware of potential excessive weakness, infection, or localized bleeding  He wishes to proceed so will have him return for injections once approved

## 2021-04-28 NOTE — PROGRESS NOTES
Patient ID: Amy Rivas is a 61 y o  male  Assessment/Plan:    Blepharospasm of both eyes  Eye blinking and spasms with partial improvement on trihexyphenidyl but he was unable to tolerate this  Time spent discussing the treatment of blood for spasm  We discussed the potential use of neurotoxin injections such as Botox or Xeomin  He was treatment will last about 3 months at a time  We would start off with lower doses with plans to increase if ineffective  Potential risks and benefits discussed  Here is aware of potential excessive weakness, infection, or localized bleeding  He wishes to proceed so will have him return for injections once approved  Diagnoses and all orders for this visit:    Blepharospasm of both eyes           Subjective:       Mr Jasmine Ryder is a man with OCD, hyperlipidema, PTSD, hx of bladder cancer undergoing treatment who presents for Movement disorder follow up for eye blinking and spasms which clinically appears more like blepharospasm versus late onset motor tic  To review, he gradually developed frequent eye blinking and spasms in April/May 2020 which worsened to the point of being present much of the day  Fluoextine for 18 years for OCD has been controlled  Dx in his 29's  He has "night tremors" since 2019  Left hand tremor on occasion while driving  Family history of "undiagnosed tics" described as some with eye twiching ( cousins and uncle)  He can suppress blinking for short periods of time if he really tries to concentrate on this  There is no history of frequent blinking or motor tics as a child  No sensory tricks  No photophobia  He denies any premonitory sensation or release with movements  Seen 9/3/20 and started on trihexyphenidyl with improvement (bid dosing caused him to feel "foggy")  MRI of the brain with very mild chronic mcirangiopathy  He discontinued trihexyphenidyl due to cognitive fog affecting his ability to concentrate at work     He continues to have constant eye blinking which is bothersome  This is worse in certain lights  Opening eye wide or rubbing the eye helps  No clear sensory tricks  Drops for dry eye did not help  He does note that 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  They resume once he stops  Objective:    Blood pressure 122/77, pulse 70, weight 88 kg (194 lb)  Physical Exam  Eyes:      Extraocular Movements: Extraocular movements intact  Pupils: Pupils are equal, round, and reactive to light  Neurological:      Mental Status: He is alert  Deep Tendon Reflexes: Strength normal    Psychiatric:         Speech: Speech normal          Neurological Exam  Mental Status  Alert  Oriented only to person, place and situation  Recent and remote memory are intact  Speech is normal  Follows complex commands  Attention and concentration are normal     Cranial Nerves  CN II: Right funduscopic exam: not visualized  Left funduscopic exam: not visualized  CN III, IV, VI: Extraocular movements intact bilaterally  Pupils equal round and reactive to light bilaterally  CN VII: Full and symmetric facial movement  CN VIII: Hearing is normal   CN IX, X: Palate elevates symmetrically  CN XI: Shoulder shrug strength is normal   CN XII: Tongue midline without atrophy or fasciculations  Motor   Normal muscle tone  Frequent eye blinking of both eye  Constant , dampening only during eye exam  No sensory trick  No additional facial movement    Strength is 5/5 throughout all four extremities  Sensory  Light touch is normal in upper and lower extremities  Coordination  Right: Finger-to-nose normal   Left: Finger-to-nose normal     Gait  Casual gait is normal including stance, stride, and arm swing  Able to rise from chair without using arms  ROS:    Review of Systems    Constitutional: Negative  Negative for appetite change and fever  HENT: Negative   Negative for hearing loss, tinnitus, trouble swallowing and voice change  Eyes: Negative for photophobia and pain  Blinking is not as sustained as it was   Respiratory: Negative  Negative for shortness of breath  Cardiovascular: Negative  Negative for palpitations  Gastrointestinal: Negative  Negative for nausea and vomiting  Endocrine: Negative  Negative for cold intolerance  Genitourinary: Negative  Negative for dysuria, frequency and urgency  Musculoskeletal: Negative  Negative for myalgias and neck pain  Skin: Negative  Negative for rash  Allergic/Immunologic: Negative  Neurological: Negative  Negative for dizziness, tremors, seizures, syncope, facial asymmetry, speech difficulty, weakness, light-headedness, numbness and headaches  Hematological: Negative  Does not bruise/bleed easily  Psychiatric/Behavioral: Negative  Negative for confusion, hallucinations and sleep disturbance  All other systems reviewed and are negative  Review of system was personally reviewed

## 2021-04-28 NOTE — PROGRESS NOTES
Review of Systems   Constitutional: Negative  Negative for appetite change and fever  HENT: Negative  Negative for hearing loss, tinnitus, trouble swallowing and voice change  Eyes: Negative for photophobia and pain  Blinking is not as sustained as it was     Respiratory: Negative  Negative for shortness of breath  Cardiovascular: Negative  Negative for palpitations  Gastrointestinal: Negative  Negative for nausea and vomiting  Endocrine: Negative  Negative for cold intolerance  Genitourinary: Negative  Negative for dysuria, frequency and urgency  Musculoskeletal: Negative  Negative for myalgias and neck pain  Skin: Negative  Negative for rash  Allergic/Immunologic: Negative  Neurological: Negative  Negative for dizziness, tremors, seizures, syncope, facial asymmetry, speech difficulty, weakness, light-headedness, numbness and headaches  Hematological: Negative  Does not bruise/bleed easily  Psychiatric/Behavioral: Negative  Negative for confusion, hallucinations and sleep disturbance  All other systems reviewed and are negative

## 2021-04-28 NOTE — TELEPHONE ENCOUNTER
Botox authorization has been submitted to [employer] via [employer] on 4/28/2021  Will await an approval/denial letter      Pending authorization #: [id number]

## 2021-04-30 NOTE — TELEPHONE ENCOUNTER
Per [employer] via [employer]- patient's Botox authorization is still pending  Will check back in next week to check on the status

## 2021-05-03 NOTE — TELEPHONE ENCOUNTER
Reed,    Please reach out to the patient and schedule a New Start Botox with Dr Manolo June  It will be specialty pharmacy   Please let me know once the patient is scheduled so I can attach a referral     Thank you,    Gissel Sheppard

## 2021-05-03 NOTE — TELEPHONE ENCOUNTER
Received an approval for the patient's Botox authorization from [employer] via [employer]  Approval letter will be received via fax  Approval letter from portal will be scanned into the patient's chart under "media" for future reference      Authorization information:    Authorization #: [id number]  Valid dates: 4/28/2021 until 4/28/2022- valid for 4 visits (400 units total)  Please use [facility]

## 2021-05-03 NOTE — TELEPHONE ENCOUNTER
Noted thank you- a referral has been attached to the patient's upcoming Botox appointment on 5/19/2021 with Dr Jolie Fraga in the [facility]  Type Date User Summary Attachment   General 05/03/2021  9:54 AM Juanis Hernandez care coordination  -   Note    Botox- authorization #: [id number]- valid for 4 visits- from 4/28/2021 until 4/28/2022   Please use [facility] specialty pharmacy         *Patient is a New Start Botox*     Thank you,     Elias Coyle

## 2021-05-03 NOTE — TELEPHONE ENCOUNTER
[address]- spoke with Kacy Reynolds- I advised her I was calling to enroll the patient with the specialty pharmacy  I provided her with the patent's demographic, insurance and contact information  She was able to get me over to a pharmacist to provide a verbal Rx  Spoke with Orly Gonzalez, pharmacist- provided a verbal Rx for Botox 100 units QTY: 1 under [facility] with diagnosis: G24 5 with 3 refills  Will do a status check in 2 days         Radu,    Please follow-up on this order in 2 days    Thank you    Elias Coyle

## 2021-05-06 NOTE — TELEPHONE ENCOUNTER
[address] spoke with Bar Guzman, advised I was calling to check the status of patient's botox order  Bar Guzman states that patient's botox order is still in insurance verification, asked Bar Guzman to aby patient's order as STAT   Will call for a status check in 2 days

## 2021-05-10 NOTE — TELEPHONE ENCOUNTER
Called [facility] specialty pharmacy spoke with Fredy, advised I was calling to check the status of patient's botox order  Fredy states that patient's profile is still in insurance verification, she states it is in [facility], she states there are notes from 5/7/21 to change fill to 30 day supply   Will call for a status check in 2 days and will send email to Timothy Bliss to help expedite

## 2021-05-11 NOTE — TELEPHONE ENCOUNTER
Called [facility] specialty pharmacy spoke with Augustina Denver, advised I was calling to check the status of patient's botox order  Augustina Denver states that patient's order is ready to schedule for delivery and confirmed patient's consent is on file  Botox is scheduled for delivery Wednesday 5/12/2021 to the [facility] via [employer] [facility]    Reed, please await patient's botox delivery and document once received, please advise if medication is not received by 2pm     Thanks  Jacinda Webb

## 2021-05-12 NOTE — TELEPHONE ENCOUNTER
Botox number of units: 100 Units  Botox quantity: One  Arrived at what location: [place]  Lot number: [id number]  Expiration Date: 10/2023  Appt notes indicate correct medication: Yes

## 2021-05-19 ENCOUNTER — PROCEDURE VISIT (OUTPATIENT)
Dept: NEUROLOGY | Facility: CLINIC | Age: 61
End: 2021-05-19
Payer: COMMERCIAL

## 2021-05-19 ENCOUNTER — TELEPHONE (OUTPATIENT)
Dept: NEUROLOGY | Facility: CLINIC | Age: 61
End: 2021-05-19

## 2021-05-19 VITALS — TEMPERATURE: 98.9 F | DIASTOLIC BLOOD PRESSURE: 61 MMHG | SYSTOLIC BLOOD PRESSURE: 117 MMHG | HEART RATE: 62 BPM

## 2021-05-19 DIAGNOSIS — G24.5 BLEPHAROSPASM OF BOTH EYES: Primary | ICD-10-CM

## 2021-05-19 PROCEDURE — 64612 DESTROY NERVE FACE MUSCLE: CPT | Performed by: PSYCHIATRY & NEUROLOGY

## 2021-05-19 NOTE — PROGRESS NOTES
Universal Protocol   Consent: Written consent obtained  Consent given by: patient        Chemodenervation     Date/Time 5/19/2021 11:25 AM     Performed by  Mirella Agrawal MD     Authorized by Mirella Agrawal MD        Pre-procedure details      Prepped With: Alcohol     Anesthesia  (see MAR for exact dosages): Anesthesia method:  None   Procedure details     Position:  Supine   Botox     Brand:  Botox    mL's of preservative free sterile saline:  4     Final Concentration per CC:  25 units (may change next visit to 2cc per 100 ml)    Needle gauge: 30G  Procedures     Botox Procedures: blepharospasm      Indications: blepharospasm      Last date: first injections  Injection Location      Head / Face:  R orbicularis oculi, L orbicularis oculi, R  and L     L  injection amount:  1 25 unit(s)    R  injection amount:  1 25 unit(s)    L orbicularis oculi injection amount:  6 25 unit(s)    R orbicularis oculi injection amount:  6 25 unit(s)    Comments:  1 25 units x 5 injections in each eye (medial upper and lower canthus, outer upper and lower canthus, lateral canthus)   Total Units     Total units discarded:  85   Post-procedure details      Chemodenervation:  Head or face    Facial Nerve Location[de-identified]  Bilateral facial nerve    Patient tolerance of procedure: Tolerated well, no immediate complications   Comments      Mr Georgiana Rahman 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 who present for Botox injections  To review, he gradually developed frequent eye blinking and spasms in April/May 2020 which worsened to the point of being present much of the day  Fluoextine for 18 years for OCD has been controlled  Dx in his 29's  He has "night tremors" since 2019  Left hand tremor on occasion while driving  Family history of "undiagnosed tics" described as some with eye twiching ( cousins and uncle)    He can suppress blinking for short periods of time if he really tries to concentrate on this  There is no history of frequent blinking or motor tics as a child  No sensory tricks  No photophobia  He denies any premonitory sensation or release with movements  Trial of  trihexyphenidyl with improvement but bid dosing caused him to feel "foggy"    MRI of the brain with very mild chronic mcirangiopathy       Total  Injected: 15 units

## 2021-06-01 ENCOUNTER — PATIENT MESSAGE (OUTPATIENT)
Dept: NEUROLOGY | Facility: CLINIC | Age: 61
End: 2021-06-01

## 2021-06-02 NOTE — TELEPHONE ENCOUNTER
From: Moise Godinez  To: Casandra Wood MD  Sent: 6/1/2021 3:44 PM EDT  Subject: Visit Follow-Up Question    Dr José Manuel Henning    I received a bill for my last visit for $1,000  This is covered by my insurance  I am attaching my insurance card so that your office can submit it to the insurance company for payment      Thank you

## 2021-06-18 ENCOUNTER — TELEPHONE (OUTPATIENT)
Dept: NEUROLOGY | Facility: CLINIC | Age: 61
End: 2021-06-18

## 2021-06-18 NOTE — TELEPHONE ENCOUNTER
Noted, will attach referral and order medication closer to patient's appointment in a separate encounter

## 2021-06-18 NOTE — TELEPHONE ENCOUNTER
Called pt and informed him that we do have an opening in Dr Marly Elliott schedule and we are looking to schedule his next [facility]  I offered an appt date on 08/25/2021 in the [facility] location at the time of 11:00 AM  Patient agreed and asked for a call in case we can get him in earlier in the morning on that date

## 2021-06-27 ENCOUNTER — HOSPITAL ENCOUNTER (OUTPATIENT)
Facility: HOSPITAL | Age: 61
Setting detail: OBSERVATION
Discharge: HOME | End: 2021-06-30
Attending: EMERGENCY MEDICINE | Admitting: INTERNAL MEDICINE
Payer: COMMERCIAL

## 2021-06-27 ENCOUNTER — APPOINTMENT (EMERGENCY)
Dept: CT IMAGING | Facility: HOSPITAL | Age: 61
End: 2021-06-27
Payer: COMMERCIAL

## 2021-06-27 DIAGNOSIS — R10.9 INTRACTABLE ABDOMINAL PAIN: ICD-10-CM

## 2021-06-27 DIAGNOSIS — R55 SYNCOPE: Primary | ICD-10-CM

## 2021-06-27 DIAGNOSIS — K52.9 COLITIS: ICD-10-CM

## 2021-06-27 DIAGNOSIS — R19.7 DIARRHEA: ICD-10-CM

## 2021-06-27 DIAGNOSIS — R10.30 LOWER ABDOMINAL PAIN: ICD-10-CM

## 2021-06-27 DIAGNOSIS — K22.10 BARRETT'S ESOPHAGEAL ULCERATION: ICD-10-CM

## 2021-06-27 PROBLEM — Z98.890 S/P GASTRIC SURGERY: Status: ACTIVE | Noted: 2021-06-27

## 2021-06-27 PROBLEM — E11.9 TYPE 2 DIABETES MELLITUS, WITHOUT LONG-TERM CURRENT USE OF INSULIN (HCC): Status: ACTIVE | Noted: 2021-06-27

## 2021-06-27 PROBLEM — R79.89 ELEVATED LACTIC ACID LEVEL: Status: ACTIVE | Noted: 2021-06-27

## 2021-06-27 LAB
ALBUMIN SERPL BCP-MCNC: 3.7 G/DL (ref 3.5–5)
ALP SERPL-CCNC: 90 U/L (ref 46–116)
ALT SERPL W P-5'-P-CCNC: 33 U/L (ref 12–78)
ANION GAP SERPL CALCULATED.3IONS-SCNC: 7 MMOL/L (ref 4–13)
AST SERPL W P-5'-P-CCNC: 17 U/L (ref 5–45)
ATRIAL RATE: 65 BPM
BACTERIA UR QL AUTO: NORMAL /HPF
BASOPHILS # BLD AUTO: 0.04 THOUSANDS/ΜL (ref 0–0.1)
BASOPHILS NFR BLD AUTO: 0 % (ref 0–1)
BILIRUB SERPL-MCNC: 0.43 MG/DL (ref 0.2–1)
BILIRUB UR QL STRIP: NEGATIVE
BUN SERPL-MCNC: 21 MG/DL (ref 5–25)
CALCIUM SERPL-MCNC: 9.4 MG/DL (ref 8.3–10.1)
CHLORIDE SERPL-SCNC: 103 MMOL/L (ref 100–108)
CLARITY UR: CLEAR
CO2 SERPL-SCNC: 29 MMOL/L (ref 21–32)
COLOR UR: YELLOW
CREAT SERPL-MCNC: 1.22 MG/DL (ref 0.6–1.3)
EOSINOPHIL # BLD AUTO: 0.14 THOUSAND/ΜL (ref 0–0.61)
EOSINOPHIL NFR BLD AUTO: 1 % (ref 0–6)
ERYTHROCYTE [DISTWIDTH] IN BLOOD BY AUTOMATED COUNT: 12.5 % (ref 11.6–15.1)
GFR SERPL CREATININE-BSD FRML MDRD: 64 ML/MIN/1.73SQ M
GLUCOSE SERPL-MCNC: 187 MG/DL (ref 65–140)
GLUCOSE UR STRIP-MCNC: NEGATIVE MG/DL
HCT VFR BLD AUTO: 42.8 % (ref 36.5–49.3)
HGB BLD-MCNC: 14.4 G/DL (ref 12–17)
HGB UR QL STRIP.AUTO: ABNORMAL
IMM GRANULOCYTES # BLD AUTO: 0.05 THOUSAND/UL (ref 0–0.2)
IMM GRANULOCYTES NFR BLD AUTO: 1 % (ref 0–2)
KETONES UR STRIP-MCNC: NEGATIVE MG/DL
LACTATE SERPL-SCNC: 0.9 MMOL/L (ref 0.5–2)
LACTATE SERPL-SCNC: 2.3 MMOL/L (ref 0.5–2)
LEUKOCYTE ESTERASE UR QL STRIP: NEGATIVE
LIPASE SERPL-CCNC: 595 U/L (ref 73–393)
LYMPHOCYTES # BLD AUTO: 1.78 THOUSANDS/ΜL (ref 0.6–4.47)
LYMPHOCYTES NFR BLD AUTO: 18 % (ref 14–44)
MAGNESIUM SERPL-MCNC: 2.1 MG/DL (ref 1.6–2.6)
MCH RBC QN AUTO: 30.5 PG (ref 26.8–34.3)
MCHC RBC AUTO-ENTMCNC: 33.6 G/DL (ref 31.4–37.4)
MCV RBC AUTO: 91 FL (ref 82–98)
MONOCYTES # BLD AUTO: 0.79 THOUSAND/ΜL (ref 0.17–1.22)
MONOCYTES NFR BLD AUTO: 8 % (ref 4–12)
NEUTROPHILS # BLD AUTO: 6.9 THOUSANDS/ΜL (ref 1.85–7.62)
NEUTS SEG NFR BLD AUTO: 72 % (ref 43–75)
NITRITE UR QL STRIP: NEGATIVE
NON-SQ EPI CELLS URNS QL MICRO: NORMAL /HPF
NRBC BLD AUTO-RTO: 0 /100 WBCS
P AXIS: 43 DEGREES
PH UR STRIP.AUTO: 6.5 [PH]
PLATELET # BLD AUTO: 239 THOUSANDS/UL (ref 149–390)
PMV BLD AUTO: 9.7 FL (ref 8.9–12.7)
POTASSIUM SERPL-SCNC: 3.9 MMOL/L (ref 3.5–5.3)
PR INTERVAL: 152 MS
PROT SERPL-MCNC: 7.2 G/DL (ref 6.4–8.2)
PROT UR STRIP-MCNC: NEGATIVE MG/DL
QRS AXIS: 59 DEGREES
QRSD INTERVAL: 82 MS
QT INTERVAL: 394 MS
QTC INTERVAL: 409 MS
RBC # BLD AUTO: 4.72 MILLION/UL (ref 3.88–5.62)
RBC #/AREA URNS AUTO: NORMAL /HPF
SODIUM SERPL-SCNC: 139 MMOL/L (ref 136–145)
SP GR UR STRIP.AUTO: 1.01 (ref 1–1.03)
T WAVE AXIS: 21 DEGREES
TRIGL SERPL-MCNC: 136 MG/DL
TROPONIN I SERPL-MCNC: <0.02 NG/ML
TROPONIN I SERPL-MCNC: <0.02 NG/ML
UROBILINOGEN UR QL STRIP.AUTO: 0.2 E.U./DL
VENTRICULAR RATE: 65 BPM
WBC # BLD AUTO: 9.7 THOUSAND/UL (ref 4.31–10.16)
WBC #/AREA URNS AUTO: NORMAL /HPF

## 2021-06-27 PROCEDURE — 36415 COLL VENOUS BLD VENIPUNCTURE: CPT | Performed by: EMERGENCY MEDICINE

## 2021-06-27 PROCEDURE — 81001 URINALYSIS AUTO W/SCOPE: CPT | Performed by: EMERGENCY MEDICINE

## 2021-06-27 PROCEDURE — 83735 ASSAY OF MAGNESIUM: CPT | Performed by: EMERGENCY MEDICINE

## 2021-06-27 PROCEDURE — 99219 PR INITIAL OBSERVATION CARE/DAY 50 MINUTES: CPT | Performed by: NURSE PRACTITIONER

## 2021-06-27 PROCEDURE — 87040 BLOOD CULTURE FOR BACTERIA: CPT | Performed by: EMERGENCY MEDICINE

## 2021-06-27 PROCEDURE — 74174 CTA ABD&PLVS W/CONTRAST: CPT

## 2021-06-27 PROCEDURE — 96361 HYDRATE IV INFUSION ADD-ON: CPT

## 2021-06-27 PROCEDURE — 93005 ELECTROCARDIOGRAM TRACING: CPT

## 2021-06-27 PROCEDURE — 96376 TX/PRO/DX INJ SAME DRUG ADON: CPT

## 2021-06-27 PROCEDURE — 84478 ASSAY OF TRIGLYCERIDES: CPT | Performed by: NURSE PRACTITIONER

## 2021-06-27 PROCEDURE — 85025 COMPLETE CBC W/AUTO DIFF WBC: CPT | Performed by: EMERGENCY MEDICINE

## 2021-06-27 PROCEDURE — 71275 CT ANGIOGRAPHY CHEST: CPT

## 2021-06-27 PROCEDURE — 99285 EMERGENCY DEPT VISIT HI MDM: CPT

## 2021-06-27 PROCEDURE — 80053 COMPREHEN METABOLIC PANEL: CPT | Performed by: EMERGENCY MEDICINE

## 2021-06-27 PROCEDURE — 96375 TX/PRO/DX INJ NEW DRUG ADDON: CPT

## 2021-06-27 PROCEDURE — 84484 ASSAY OF TROPONIN QUANT: CPT | Performed by: EMERGENCY MEDICINE

## 2021-06-27 PROCEDURE — 84484 ASSAY OF TROPONIN QUANT: CPT | Performed by: NURSE PRACTITIONER

## 2021-06-27 PROCEDURE — 99285 EMERGENCY DEPT VISIT HI MDM: CPT | Performed by: EMERGENCY MEDICINE

## 2021-06-27 PROCEDURE — 83605 ASSAY OF LACTIC ACID: CPT | Performed by: EMERGENCY MEDICINE

## 2021-06-27 PROCEDURE — 96374 THER/PROPH/DIAG INJ IV PUSH: CPT

## 2021-06-27 PROCEDURE — 93010 ELECTROCARDIOGRAM REPORT: CPT | Performed by: INTERNAL MEDICINE

## 2021-06-27 PROCEDURE — C9113 INJ PANTOPRAZOLE SODIUM, VIA: HCPCS | Performed by: NURSE PRACTITIONER

## 2021-06-27 PROCEDURE — 83690 ASSAY OF LIPASE: CPT | Performed by: EMERGENCY MEDICINE

## 2021-06-27 RX ORDER — OXYCODONE HYDROCHLORIDE 5 MG/1
5 TABLET ORAL EVERY 4 HOURS PRN
Status: DISCONTINUED | OUTPATIENT
Start: 2021-06-27 | End: 2021-06-30 | Stop reason: HOSPADM

## 2021-06-27 RX ORDER — SODIUM CHLORIDE, SODIUM LACTATE, POTASSIUM CHLORIDE, CALCIUM CHLORIDE 600; 310; 30; 20 MG/100ML; MG/100ML; MG/100ML; MG/100ML
125 INJECTION, SOLUTION INTRAVENOUS CONTINUOUS
Status: DISCONTINUED | OUTPATIENT
Start: 2021-06-27 | End: 2021-06-28

## 2021-06-27 RX ORDER — HYDROMORPHONE HCL/PF 1 MG/ML
0.2 SYRINGE (ML) INJECTION EVERY 4 HOURS PRN
Status: DISCONTINUED | OUTPATIENT
Start: 2021-06-27 | End: 2021-06-30 | Stop reason: HOSPADM

## 2021-06-27 RX ORDER — PANTOPRAZOLE SODIUM 40 MG/1
40 INJECTION, POWDER, FOR SOLUTION INTRAVENOUS ONCE
Status: COMPLETED | OUTPATIENT
Start: 2021-06-27 | End: 2021-06-27

## 2021-06-27 RX ORDER — HYDROMORPHONE HCL/PF 1 MG/ML
1 SYRINGE (ML) INJECTION ONCE
Status: COMPLETED | OUTPATIENT
Start: 2021-06-27 | End: 2021-06-27

## 2021-06-27 RX ORDER — PANTOPRAZOLE SODIUM 40 MG/1
40 TABLET, DELAYED RELEASE ORAL
Status: DISCONTINUED | OUTPATIENT
Start: 2021-06-28 | End: 2021-06-29

## 2021-06-27 RX ORDER — ONDANSETRON 2 MG/ML
4 INJECTION INTRAMUSCULAR; INTRAVENOUS ONCE
Status: COMPLETED | OUTPATIENT
Start: 2021-06-27 | End: 2021-06-27

## 2021-06-27 RX ORDER — MAGNESIUM HYDROXIDE/ALUMINUM HYDROXICE/SIMETHICONE 120; 1200; 1200 MG/30ML; MG/30ML; MG/30ML
30 SUSPENSION ORAL EVERY 6 HOURS PRN
Status: DISCONTINUED | OUTPATIENT
Start: 2021-06-27 | End: 2021-06-30 | Stop reason: HOSPADM

## 2021-06-27 RX ORDER — B-COMPLEX WITH VITAMIN C
1 TABLET ORAL 2 TIMES DAILY WITH MEALS
Status: DISCONTINUED | OUTPATIENT
Start: 2021-06-28 | End: 2021-06-30 | Stop reason: HOSPADM

## 2021-06-27 RX ORDER — FLUOXETINE HYDROCHLORIDE 20 MG/1
60 CAPSULE ORAL DAILY
Status: DISCONTINUED | OUTPATIENT
Start: 2021-06-28 | End: 2021-06-28

## 2021-06-27 RX ORDER — ONDANSETRON 2 MG/ML
4 INJECTION INTRAMUSCULAR; INTRAVENOUS EVERY 6 HOURS PRN
Status: DISCONTINUED | OUTPATIENT
Start: 2021-06-27 | End: 2021-06-30 | Stop reason: HOSPADM

## 2021-06-27 RX ORDER — ATORVASTATIN CALCIUM 20 MG/1
20 TABLET, FILM COATED ORAL DAILY
Status: DISCONTINUED | OUTPATIENT
Start: 2021-06-28 | End: 2021-06-28

## 2021-06-27 RX ORDER — HYDROMORPHONE HCL/PF 1 MG/ML
0.5 SYRINGE (ML) INJECTION ONCE
Status: COMPLETED | OUTPATIENT
Start: 2021-06-27 | End: 2021-06-27

## 2021-06-27 RX ORDER — OXYCODONE HYDROCHLORIDE 5 MG/1
2.5 TABLET ORAL EVERY 4 HOURS PRN
Status: DISCONTINUED | OUTPATIENT
Start: 2021-06-27 | End: 2021-06-30 | Stop reason: HOSPADM

## 2021-06-27 RX ADMIN — ONDANSETRON 4 MG: 2 INJECTION INTRAMUSCULAR; INTRAVENOUS at 23:55

## 2021-06-27 RX ADMIN — SODIUM CHLORIDE, SODIUM LACTATE, POTASSIUM CHLORIDE, AND CALCIUM CHLORIDE 125 ML/HR: .6; .31; .03; .02 INJECTION, SOLUTION INTRAVENOUS at 22:34

## 2021-06-27 RX ADMIN — OXYCODONE HYDROCHLORIDE 5 MG: 5 TABLET ORAL at 23:52

## 2021-06-27 RX ADMIN — SODIUM CHLORIDE 1000 ML: 0.9 INJECTION, SOLUTION INTRAVENOUS at 18:49

## 2021-06-27 RX ADMIN — ONDANSETRON 4 MG: 2 INJECTION INTRAMUSCULAR; INTRAVENOUS at 18:40

## 2021-06-27 RX ADMIN — IOHEXOL 100 ML: 350 INJECTION, SOLUTION INTRAVENOUS at 18:54

## 2021-06-27 RX ADMIN — HYDROMORPHONE HYDROCHLORIDE 0.5 MG: 1 INJECTION, SOLUTION INTRAMUSCULAR; INTRAVENOUS; SUBCUTANEOUS at 18:40

## 2021-06-27 RX ADMIN — HYDROMORPHONE HYDROCHLORIDE 1 MG: 1 INJECTION, SOLUTION INTRAMUSCULAR; INTRAVENOUS; SUBCUTANEOUS at 20:21

## 2021-06-27 RX ADMIN — PIPERACILLIN AND TAZOBACTAM 3.38 G: 36; 4.5 INJECTION, POWDER, FOR SOLUTION INTRAVENOUS at 21:34

## 2021-06-27 RX ADMIN — PANTOPRAZOLE SODIUM 40 MG: 40 INJECTION, POWDER, FOR SOLUTION INTRAVENOUS at 22:37

## 2021-06-27 NOTE — ED PROVIDER NOTES
History  Chief Complaint   Patient presents with    Abdominal Pain     pt arrives by EMS after near syncope  pt states he was laying down when suddenly he had cold sweats and almosted blacked out  pt states afterwards he had sudden onset of abd and lower back pain  49-year-old male patient presents the ED for evaluation of syncopal event, states he suddenly passed out then had sudden onset of generalized abdominal discomfort with radiation to the back  No vomiting or diarrhea  No prior episodes of anything similar  He had 1 prior surgery to his abdomen, hernia repair  No recent surgeries  He states the pain is a 10/10, difficult to pinpoint  History provided by:  Patient   used: No    Abdominal Pain  Pain location:  Generalized      Prior to Admission Medications   Prescriptions Last Dose Informant Patient Reported? Taking?    Calcium Carb-Cholecalciferol (CALCIUM CARBONATE-VITAMIN D3 PO)   Yes No   Sig: Take by mouth Three times a day   Cyanocobalamin (VITAMIN B 12 PO)   Yes No   Sig: Take 1 tablet by mouth   FLUoxetine (PROzac) 20 mg capsule   No No   Sig: TAKE 3 CAPSULES DAILY   Multiple Vitamins-Iron (MULTIVITAMIN/IRON PO)   Yes No   Sig: Take by mouth 2 (two) times a day   aspirin (ECOTRIN LOW STRENGTH) 81 mg EC tablet  Self Yes No   Sig: Take 81 mg by mouth daily   atorvastatin (LIPITOR) 20 mg tablet   No No   Sig: TAKE 1 TABLET DAILY   metFORMIN (GLUCOPHAGE) 500 mg tablet  Self Yes No   Sig: Take 500 mg by mouth daily with breakfast   omeprazole (PriLOSEC) 20 mg delayed release capsule   Yes No   Sig: as needed   phentermine 37 5 MG capsule  Self Yes No   Sig: Take 37 5 mg by mouth every morning   topiramate (TOPAMAX) 25 mg tablet   Yes No   Sig: Take 50 mg by mouth daily      Facility-Administered Medications: None       Past Medical History:   Diagnosis Date    Atypical chest pain     Bladder cancer ([facility] [address] )     CN IV nerve palsy, right eye     Diabetes mellitus ([facility] [address] )  Hyperlipidemia     IFG (impaired fasting glucose)     OCD (obsessive compulsive disorder)     Snorings        Past Surgical History:   Procedure Laterality Date    CARDIAC CATHETERIZATION      normal coronary arteries    COLONOSCOPY      HAND SURGERY Left     HERNIA REPAIR      AR COLONOSCOPY FLX DX W/COLLJ SPEC WHEN PFRMD N/A 3/25/2017    Procedure: COLONOSCOPY;  Surgeon: Dante Prabhakar MD;  [facility]; Service: Gastroenterology    TONSILLECTOMY      WISDOM TOOTH EXTRACTION         Family History   Problem Relation Age of Onset    Cardiomyopathy Mother     Hypertension Mother     Congenital heart disease Sister     Coronary artery disease Brother     Coronary artery disease Family      I have reviewed and agree with the history as documented  E-Cigarette/Vaping    E-Cigarette Use Never User      E-Cigarette/Vaping Substances     Social History     Tobacco Use    Smoking status: Former Smoker     Packs/day: 3 00     Types: Cigarettes     Quit date:      Years since quittin 5    Smokeless tobacco: Never Used   Vaping Use    Vaping Use: Never used   Substance Use Topics    Alcohol use: No    Drug use: No       Review of Systems   Gastrointestinal: Positive for abdominal pain  All other systems reviewed and are negative  Physical Exam  Physical Exam  Vitals and nursing note reviewed  Constitutional:       Appearance: He is well-developed and normal weight  HENT:      Head: Normocephalic and atraumatic  Mouth/Throat:      Mouth: Mucous membranes are moist    Eyes:      General: No scleral icterus  Conjunctiva/sclera: Conjunctivae normal    Cardiovascular:      Rate and Rhythm: Normal rate and regular rhythm  Heart sounds: Normal heart sounds  No murmur heard  Pulmonary:      Effort: Pulmonary effort is normal  No respiratory distress  Breath sounds: Normal breath sounds  Abdominal:      General: Abdomen is flat   Bowel sounds are normal  There is no distension  Palpations: Abdomen is soft  Tenderness: There is generalized abdominal tenderness  There is no right CVA tenderness, left CVA tenderness, guarding or rebound  Negative signs include Trevino's sign and Rovsing's sign  Hernia: No hernia is present  Musculoskeletal:      Cervical back: Neck supple  Skin:     General: Skin is warm and dry  Capillary Refill: Capillary refill takes less than 2 seconds  Neurological:      General: No focal deficit present  Mental Status: He is alert     Psychiatric:         Mood and Affect: Mood normal          Behavior: Behavior normal          Vital Signs  ED Triage Vitals   Temperature Pulse Respirations Blood Pressure SpO2   06/27/21 1811 06/27/21 1808 06/27/21 1808 06/27/21 1808 06/27/21 1808   98 1 °F (36 7 °C) 63 18 (!) 171/90 98 %      Temp Source Heart Rate Source Patient Position - Orthostatic VS BP Location FiO2 (%)   06/27/21 1811 06/27/21 1808 06/27/21 1808 06/27/21 1930 --   Oral Monitor Lying Left arm       Pain Score       06/27/21 1840       Worst Possible Pain           Vitals:    06/27/21 1808 06/27/21 1930 06/27/21 2030 06/27/21 2313   BP: (!) 171/90 136/75 129/60 122/68   Pulse: 63 76 74 67   Patient Position - Orthostatic VS: Lying Lying Lying Lying         Visual Acuity  Visual Acuity      Most Recent Value   L Pupil Size (mm)  3  (Pended)    R Pupil Size (mm)  3  (Pended)    L Pupil Shape  Round  (Pended)    R Pupil Shape  Round  (Pended)           ED Medications  Medications   calcium carbonate-vitamin D (OSCAL-D) 500 mg-200 units per tablet 1 tablet (has no administration in time range)   pantoprazole (PROTONIX) EC tablet 40 mg (has no administration in time range)   lactated ringers infusion (125 mL/hr Intravenous New Bag 6/28/21 0004)   ondansetron (ZOFRAN) injection 4 mg (has no administration in time range)   aluminum-magnesium hydroxide-simethicone (MYLANTA) oral suspension 30 mL (has no administration in time range)   oxyCODONE (ROXICODONE) IR tablet 2 5 mg ( Oral See Alternative 6/27/21 2352)     Or   oxyCODONE (ROXICODONE) IR tablet 5 mg (5 mg Oral Given 6/27/21 2352)   HYDROmorphone (DILAUDID) injection 0 2 mg (has no administration in time range)   multivitamin stress formula tablet 1 tablet (has no administration in time range)   atorvastatin (LIPITOR) tablet 20 mg (20 mg Oral Given 6/28/21 0007)   FLUoxetine (PROzac) capsule 60 mg (60 mg Oral Given 6/28/21 0007)   ondansetron (ZOFRAN) injection 4 mg (4 mg Intravenous Given 6/27/21 1840)   HYDROmorphone (DILAUDID) injection 0 5 mg (0 5 mg Intravenous Given 6/27/21 1840)   sodium chloride 0 9 % bolus 1,000 mL (0 mL Intravenous Stopped 6/27/21 2017)   iohexol (OMNIPAQUE) 350 MG/ML injection (SINGLE-DOSE) 100 mL (100 mL Intravenous Given 6/27/21 1854)   HYDROmorphone (DILAUDID) injection 1 mg (1 mg Intravenous Given 6/27/21 2021)   piperacillin-tazobactam (ZOSYN) 3 375 g in sodium chloride 0 9 % 100 mL IVPB (0 g Intravenous Stopped 6/27/21 2232)   pantoprazole (PROTONIX) injection 40 mg (40 mg Intravenous Given 6/27/21 2237)       Diagnostic Studies  Results Reviewed     Procedure Component Value Units Date/Time    Troponin I [[id number]]     Lab Status: No result Specimen: Blood     Blood culture #1 [[id number]] Collected: 06/27/21 1848    Lab Status: Preliminary result Specimen: Blood from Arm, Right Updated: 06/28/21 0001     Blood Culture Received in Microbiology Lab  Culture in Progress  Blood culture #2 [[id number]] Collected: 06/27/21 1848    Lab Status: Preliminary result Specimen: Blood from Arm, Right Updated: 06/28/21 0001     Blood Culture Received in Microbiology Lab  Culture in Progress      Lactic acid 2 Hours [[id number]]  (Normal) Collected: 06/27/21 2134    Lab Status: Final result Specimen: Blood from Arm, Right Updated: 06/27/21 2310     LACTIC ACID 0 9 mmol/L     Narrative:      Result may be elevated if tourniquet was used during collection  Troponin I [[id number]]     Lab Status: No result Specimen: Blood     Troponin I [[id number]]  (Normal) Collected: 06/27/21 2232    Lab Status: Final result Specimen: Blood from Arm, Right Updated: 06/27/21 2257     Troponin I <0 02 ng/mL     Triglycerides [[id number]]  (Normal) Collected: 06/27/21 1848    Lab Status: Final result Specimen: Blood from Arm, Right Updated: 06/27/21 2241     Triglycerides 136 mg/dL     Clostridium difficile toxin by PCR with EIA [[id number]]     Lab Status: No result Specimen: Stool from Per Rectum     Urine Microscopic [[id number]]  (Normal) Collected: 06/27/21 2018    Lab Status: Final result Specimen: Urine, Clean Catch Updated: 06/27/21 2053     RBC, UA 2-4 /hpf      WBC, UA 1-2 /hpf      Epithelial Cells None Seen /hpf      Bacteria, UA Occasional /hpf     UA w Reflex to Microscopic w Reflex to Culture [[id number]]  (Abnormal) Collected: 06/27/21 2018    Lab Status: Final result Specimen: Urine, Clean Catch Updated: 06/27/21 2031     Color, UA Yellow     Clarity, UA Clear     Specific Gravity, UA 1 010     pH, UA 6 5     Leukocytes, UA Negative     Nitrite, UA Negative     Protein, UA Negative mg/dl      Glucose, UA Negative mg/dl      Ketones, UA Negative mg/dl      Urobilinogen, UA 0 2 E U /dl      Bilirubin, UA Negative     Blood, UA Small    Lactic acid, plasma [[id number]]  (Abnormal) Collected: 06/27/21 1848    Lab Status: Final result Specimen: Blood from Arm, Right Updated: 06/27/21 1925     LACTIC ACID 2 3 mmol/L     Narrative:      Result may be elevated if tourniquet was used during collection      Troponin I [[id number]]  (Normal) Collected: 06/27/21 1848    Lab Status: Final result Specimen: Blood from Arm, Right Updated: 06/27/21 1921     Troponin I <0 02 ng/mL     CMP [[id number]]  (Abnormal) Collected: 06/27/21 1848    Lab Status: Final result Specimen: Blood from Arm, Right Updated: 06/27/21 1920     Sodium 139 mmol/L      Potassium 3 9 mmol/L Chloride 103 mmol/L      CO2 29 mmol/L      ANION GAP 7 mmol/L      BUN 21 mg/dL      Creatinine 1 22 mg/dL      Glucose 187 mg/dL      Calcium 9 4 mg/dL      AST 17 U/L      ALT 33 U/L      Alkaline Phosphatase 90 U/L      Total Protein 7 2 g/dL      Albumin 3 7 g/dL      Total Bilirubin 0 43 mg/dL      eGFR 64 ml/min/1 73sq m     Narrative:      [place] guidelines for Chronic Kidney Disease (CKD):     Stage 1 with normal or high GFR (GFR > 90 mL/min/1 73 square meters)    Stage 2 Mild CKD (GFR = 60-89 mL/min/1 73 square meters)    Stage 3A Moderate CKD (GFR = 45-59 mL/min/1 73 square meters)    Stage 3B Moderate CKD (GFR = 30-44 mL/min/1 73 square meters)    Stage 4 Severe CKD (GFR = 15-29 mL/min/1 73 square meters)    Stage 5 End Stage CKD (GFR <15 mL/min/1 73 square meters)  Note: GFR calculation is accurate only with a steady state creatinine    Lipase [[id number]]  (Abnormal) Collected: 06/27/21 1848    Lab Status: Final result Specimen: Blood from Arm, Right Updated: 06/27/21 1920     Lipase 595 u/L     Magnesium [[id number]]  (Normal) Collected: 06/27/21 1848    Lab Status: Final result Specimen: Blood from Arm, Right Updated: 06/27/21 1920     Magnesium 2 1 mg/dL     CBC and differential [[id number]] Collected: 06/27/21 1848    Lab Status: Final result Specimen: Blood from Arm, Right Updated: 06/27/21 1910     WBC 9 70 Thousand/uL      RBC 4 72 Million/uL      Hemoglobin 14 4 g/dL      Hematocrit 42 8 %      MCV 91 fL      MCH 30 5 pg      MCHC 33 6 g/dL      RDW 12 5 %      MPV 9 7 fL      Platelets 640 Thousands/uL      nRBC 0 /100 WBCs      Neutrophils Relative 72 %      Immat GRANS % 1 %      Lymphocytes Relative 18 %      Monocytes Relative 8 %      Eosinophils Relative 1 %      Basophils Relative 0 %      Neutrophils Absolute 6 90 Thousands/µL      Immature Grans Absolute 0 05 Thousand/uL      Lymphocytes Absolute 1 78 Thousands/µL      Monocytes Absolute 0 79 Thousand/µL Eosinophils Absolute 0 14 Thousand/µL      Basophils Absolute 0 04 Thousands/µL                  CTA dissection protocol chest/abdomen/pelvis   Final Result by Mitchell Sanchez MD (06/27 2018)      No evidence of aortic aneurysm or dissection  No intramural hematoma  No infiltrate or pleural effusion  Questionable mild diffuse colonic wall thickening with no significant pericolonic inflammatory stranding which may be secondary to an infectious or inflammatory colitis versus underdistention  The study was limited by the lack of oral contrast      Correlation with patient's symptoms is recommended  No free air or free fluid  Small sliding hiatal hernia  Workstation performed: [id number]                    Procedures  Procedures         ED Course  ED Course as of Jun 28 0041   Sun Jun 27, 2021   257 44-year-old male with severe abdominal pain with syncope  His vital signs are stable however he his pain is severe and out of proportion to exam   Stat CT a chest abdomen pelvis ordered, discussed with CT tech and RN  Medicated with Dilaudid, IV fluids infusing  2144 D/w surgery resident Gita Boast, requested consultation for abdominal pain  Looked at CT scan and states no surgical emergency  [facility] accepts to service under Keo [facility]  [facility]  Number of Diagnoses or Management Options  Colitis: new and requires workup  Diarrhea: new and requires workup  Intractable abdominal pain: new and requires workup  Syncope: new and requires workup  Diagnosis management comments: 44-year-old male with syncopal event and generalized abdominal pain with diarrhea  Stated his pain was severe although exam was mild, tenderness nonspecific and generalized  No peritoneal signs  He states he feels like it radiates the back  Workup showing possible colitis and mesenteric stranding    Of note his lipase is elevated in the 500s, this may be acute pancreatitis versus colitis  Discussed case with General surgery resident who get a CT and recommended no surgical intervention, no surgical problems present  Discussed case with [facility] except service under Dr Kimberli Granados  Patient's pain did improve the ED after IV fluid resuscitation and IV Dilaudid, Zofran  Amount and/or Complexity of Data Reviewed  Clinical lab tests: ordered and reviewed  Tests in the radiology section of CPT®: ordered and reviewed  Discuss the patient with other providers: yes  Independent visualization of images, tracings, or specimens: yes    Risk of Complications, Morbidity, and/or Mortality  Presenting problems: moderate  Diagnostic procedures: moderate  Management options: moderate    Patient Progress  Patient progress: improved      Disposition  Final diagnoses:   Syncope   Intractable abdominal pain   Colitis   Diarrhea     Time reflects when diagnosis was documented in both MDM as applicable and the Disposition within this note     Time User Action Codes Description Comment    6/27/2021  9:45 PM Tyree Ann [R55] Syncope     6/27/2021  9:45 PM Vidya Bays [R10 9] Intractable abdominal pain     6/27/2021  9:45 PM Vidya Bays [K52 9] Colitis     6/27/2021  9:45 PM Tyree Ann [R19 7] Diarrhea       ED Disposition     ED Disposition Condition Date/Time Comment    Admit Stable Sun Jun 27, 2021  9:45 PM Case was discussed with [facility] and the patient's admission status was agreed to be Admission Status: observation status to the service of Dr Kimberli Granados           Follow-up Information    None         Current Discharge Medication List      CONTINUE these medications which have NOT CHANGED    Details   aspirin (ECOTRIN LOW STRENGTH) 81 mg EC tablet Take 81 mg by mouth daily      atorvastatin (LIPITOR) 20 mg tablet TAKE 1 TABLET DAILY  Qty: 90 tablet, Refills: 3    Associated Diagnoses: Mixed hyperlipidemia      Calcium Carb-Cholecalciferol (CALCIUM CARBONATE-VITAMIN D3 PO) Take by mouth Three times a day      Cyanocobalamin (VITAMIN B 12 PO) Take 1 tablet by mouth      FLUoxetine (PROzac) 20 mg capsule TAKE 3 CAPSULES DAILY  Qty: 270 capsule, Refills: 3    Associated Diagnoses: Depression, unspecified depression type      metFORMIN (GLUCOPHAGE) 500 mg tablet Take 500 mg by mouth daily with breakfast      Multiple Vitamins-Iron (MULTIVITAMIN/IRON PO) Take by mouth 2 (two) times a day      omeprazole (PriLOSEC) 20 mg delayed release capsule as needed      phentermine 37 5 MG capsule Take 37 5 mg by mouth every morning      topiramate (TOPAMAX) 25 mg tablet Take 50 mg by mouth daily           No discharge procedures on file      PDMP Review     None          ED Provider  Electronically Signed by           Garry Myers DO  06/28/21 0041

## 2021-06-28 ENCOUNTER — APPOINTMENT (OUTPATIENT)
Dept: ULTRASOUND IMAGING | Facility: HOSPITAL | Age: 61
End: 2021-06-28
Payer: COMMERCIAL

## 2021-06-28 PROBLEM — R79.89 ELEVATED LACTIC ACID LEVEL: Status: RESOLVED | Noted: 2021-06-27 | Resolved: 2021-06-28

## 2021-06-28 LAB
ANION GAP SERPL CALCULATED.3IONS-SCNC: 5 MMOL/L (ref 4–13)
BASOPHILS # BLD AUTO: 0.02 THOUSANDS/ΜL (ref 0–0.1)
BASOPHILS NFR BLD AUTO: 0 % (ref 0–1)
BUN SERPL-MCNC: 17 MG/DL (ref 5–25)
CALCIUM SERPL-MCNC: 8 MG/DL (ref 8.3–10.1)
CHLORIDE SERPL-SCNC: 107 MMOL/L (ref 100–108)
CO2 SERPL-SCNC: 29 MMOL/L (ref 21–32)
CREAT SERPL-MCNC: 1.08 MG/DL (ref 0.6–1.3)
CRP SERPL QL: 4.6 MG/L
EOSINOPHIL # BLD AUTO: 0.09 THOUSAND/ΜL (ref 0–0.61)
EOSINOPHIL NFR BLD AUTO: 1 % (ref 0–6)
ERYTHROCYTE [DISTWIDTH] IN BLOOD BY AUTOMATED COUNT: 12.6 % (ref 11.6–15.1)
GFR SERPL CREATININE-BSD FRML MDRD: 74 ML/MIN/1.73SQ M
GLUCOSE SERPL-MCNC: 103 MG/DL (ref 65–140)
HCT VFR BLD AUTO: 38.4 % (ref 36.5–49.3)
HGB BLD-MCNC: 12.8 G/DL (ref 12–17)
IMM GRANULOCYTES # BLD AUTO: 0.05 THOUSAND/UL (ref 0–0.2)
IMM GRANULOCYTES NFR BLD AUTO: 1 % (ref 0–2)
LIPASE SERPL-CCNC: 39 U/L (ref 73–393)
LYMPHOCYTES # BLD AUTO: 1.74 THOUSANDS/ΜL (ref 0.6–4.47)
LYMPHOCYTES NFR BLD AUTO: 16 % (ref 14–44)
MCH RBC QN AUTO: 30.5 PG (ref 26.8–34.3)
MCHC RBC AUTO-ENTMCNC: 33.3 G/DL (ref 31.4–37.4)
MCV RBC AUTO: 91 FL (ref 82–98)
MONOCYTES # BLD AUTO: 1.01 THOUSAND/ΜL (ref 0.17–1.22)
MONOCYTES NFR BLD AUTO: 9 % (ref 4–12)
NEUTROPHILS # BLD AUTO: 7.89 THOUSANDS/ΜL (ref 1.85–7.62)
NEUTS SEG NFR BLD AUTO: 73 % (ref 43–75)
NRBC BLD AUTO-RTO: 0 /100 WBCS
PLATELET # BLD AUTO: 185 THOUSANDS/UL (ref 149–390)
PMV BLD AUTO: 9.1 FL (ref 8.9–12.7)
POTASSIUM SERPL-SCNC: 3.9 MMOL/L (ref 3.5–5.3)
RBC # BLD AUTO: 4.2 MILLION/UL (ref 3.88–5.62)
SODIUM SERPL-SCNC: 141 MMOL/L (ref 136–145)
TROPONIN I SERPL-MCNC: <0.02 NG/ML
TROPONIN I SERPL-MCNC: <0.02 NG/ML
WBC # BLD AUTO: 10.8 THOUSAND/UL (ref 4.31–10.16)

## 2021-06-28 PROCEDURE — 86140 C-REACTIVE PROTEIN: CPT | Performed by: INTERNAL MEDICINE

## 2021-06-28 PROCEDURE — 83690 ASSAY OF LIPASE: CPT | Performed by: NURSE PRACTITIONER

## 2021-06-28 PROCEDURE — 84484 ASSAY OF TROPONIN QUANT: CPT | Performed by: NURSE PRACTITIONER

## 2021-06-28 PROCEDURE — 76705 ECHO EXAM OF ABDOMEN: CPT

## 2021-06-28 PROCEDURE — 99225 PR SBSQ OBSERVATION CARE/DAY 25 MINUTES: CPT | Performed by: INTERNAL MEDICINE

## 2021-06-28 PROCEDURE — 93005 ELECTROCARDIOGRAM TRACING: CPT

## 2021-06-28 PROCEDURE — 99244 OFF/OP CNSLTJ NEW/EST MOD 40: CPT | Performed by: INTERNAL MEDICINE

## 2021-06-28 PROCEDURE — 85025 COMPLETE CBC W/AUTO DIFF WBC: CPT | Performed by: NURSE PRACTITIONER

## 2021-06-28 PROCEDURE — 80048 BASIC METABOLIC PNL TOTAL CA: CPT | Performed by: NURSE PRACTITIONER

## 2021-06-28 RX ORDER — DICYCLOMINE HCL 20 MG
20 TABLET ORAL
Status: DISCONTINUED | OUTPATIENT
Start: 2021-06-28 | End: 2021-06-30 | Stop reason: HOSPADM

## 2021-06-28 RX ORDER — FLUOXETINE HYDROCHLORIDE 20 MG/1
60 CAPSULE ORAL
Status: DISCONTINUED | OUTPATIENT
Start: 2021-06-28 | End: 2021-06-30 | Stop reason: HOSPADM

## 2021-06-28 RX ORDER — ATORVASTATIN CALCIUM 20 MG/1
20 TABLET, FILM COATED ORAL
Status: DISCONTINUED | OUTPATIENT
Start: 2021-06-28 | End: 2021-06-30 | Stop reason: HOSPADM

## 2021-06-28 RX ADMIN — PANTOPRAZOLE SODIUM 40 MG: 40 TABLET, DELAYED RELEASE ORAL at 05:33

## 2021-06-28 RX ADMIN — OXYCODONE HYDROCHLORIDE 5 MG: 5 TABLET ORAL at 17:23

## 2021-06-28 RX ADMIN — Medication 1 TABLET: at 08:34

## 2021-06-28 RX ADMIN — FLUOXETINE 60 MG: 20 CAPSULE ORAL at 00:07

## 2021-06-28 RX ADMIN — ATORVASTATIN CALCIUM 20 MG: 20 TABLET, FILM COATED ORAL at 00:07

## 2021-06-28 RX ADMIN — OXYCODONE HYDROCHLORIDE 5 MG: 5 TABLET ORAL at 04:43

## 2021-06-28 RX ADMIN — FLUOXETINE 60 MG: 20 CAPSULE ORAL at 21:06

## 2021-06-28 RX ADMIN — OXYCODONE HYDROCHLORIDE 5 MG: 5 TABLET ORAL at 23:43

## 2021-06-28 RX ADMIN — B-COMPLEX W/ C & FOLIC ACID TAB 1 TABLET: TAB at 08:34

## 2021-06-28 RX ADMIN — DICYCLOMINE HYDROCHLORIDE 20 MG: 20 TABLET ORAL at 21:06

## 2021-06-28 RX ADMIN — DICYCLOMINE HYDROCHLORIDE 20 MG: 20 TABLET ORAL at 16:27

## 2021-06-28 RX ADMIN — SODIUM CHLORIDE, SODIUM LACTATE, POTASSIUM CHLORIDE, AND CALCIUM CHLORIDE 125 ML/HR: .6; .31; .03; .02 INJECTION, SOLUTION INTRAVENOUS at 05:35

## 2021-06-28 RX ADMIN — OXYCODONE HYDROCHLORIDE 5 MG: 5 TABLET ORAL at 08:36

## 2021-06-28 RX ADMIN — SODIUM CHLORIDE, SODIUM LACTATE, POTASSIUM CHLORIDE, AND CALCIUM CHLORIDE 125 ML/HR: .6; .31; .03; .02 INJECTION, SOLUTION INTRAVENOUS at 00:04

## 2021-06-28 RX ADMIN — OXYCODONE HYDROCHLORIDE 5 MG: 5 TABLET ORAL at 13:00

## 2021-06-28 RX ADMIN — Medication 1 TABLET: at 16:27

## 2021-06-28 RX ADMIN — ATORVASTATIN CALCIUM 20 MG: 20 TABLET, FILM COATED ORAL at 21:06

## 2021-06-28 NOTE — ASSESSMENT & PLAN NOTE
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

## 2021-06-28 NOTE — ASSESSMENT & PLAN NOTE
· Presents to ED with near syncope  Complained of cold sweats, sudden onset of abdominal pain with radiation to back  Also reported diarrhea prior to arrival   Suspect near syncopal event secondary to acute pain      · Initial EKG NSR, HR 65  · IV fluids overnight  · Check ortho BP   · Trend troponin, serial EKG, telemetry monitoring, fall precautions

## 2021-06-28 NOTE — CONSULTS
Consultation - [facility]) [facility]  Itzel White 61 y o  male MRN: [id number]  Unit/Bed#: S -01 Encounter: [id number]       Assessment/Plan:    Abdominal pain    CTA dissection protocol chest/abdomen/pelvis:Questionable mild diffuse colonic wall thickening with no significant pericolonic inflammatory stranding which may be secondary to an infectious or inflammatory colitis versus underdistention  Lab Results   Component Value Date    LIPASE 39 (L) 06/28/2021    LIPASE 595 (H) 06/27/2021       Assessment:  Abdominal pain rule out peptic ulcer disease/gastritis/ viral/bacterial in etiology, inflammatory bowel unlikely due to 1 episode of diarrhea only  · Advanced diet as tolerated  · Continue supportive management  · Agree with Protonix 40 daily  · Recommend C diff, stool enteric panel  · Right upper quadrant ultrasound to rule out biliary nature of abdominal pain/for possible cause of lipase elevation  · Off antibiotics  · Will check ESR CRP  · Recommend IV fluids during NPO  · EGD tomorrow morning around 1:30 p m   · NPO after midnight      Reason for Consult / Principal Problem:  Abdominal pain    HPI:   Itzel White is a 61y o  year old male who presents with abdominal pain/near-syncope  PMH of gastric sleeve, hernia repair, bladder ca s/p BCG, DM 2  No history of GI cancer, no recent weight loss  Patient reported sudden abdominal pain that started yesterday afternoon described as  generalized 10/10 , radiating on his back, this is associated with syncope, by 1 episode of nausea, and diarrhea x1  This occurred after eating toast   Patient was seen in the ER was started on 1 dose of Zosyn  Lipase was slightly elevated 596>39, lactic acid 2 3> 0 9, mild leukocytosis from non 0 7-10 8  CT chest /abdomen/ pelvis:  Showing questionable mild diffuse colonic wall thickening with no significant pericolic inflammatory stranding which may be secondary to infectious and inflammatory colitis  Patient denies any fever, chills, diarrhea, blood in the stools  Patient currently pain level is  6/10 generalized pain, no nausea or diarrhea tolerating clear liquid  Last EGD:  No EGD  Last Colonoscopy:  2017 showed normal colon, torturous colon requiring abdominal pressure    Review of Systems:    Review of Systems   Gastrointestinal: Positive for abdominal distention, abdominal pain, diarrhea, nausea and vomiting  Neurological: Positive for weakness  All other systems reviewed and are negative  Historical Information   Past Medical History:   Diagnosis Date    Atypical chest pain     Bladder cancer ([facility] )     CN IV nerve palsy, right eye     Diabetes mellitus ([facility] )     Hyperlipidemia     IFG (impaired fasting glucose)     OCD (obsessive compulsive disorder)     Snorings      Past Surgical History:   Procedure Laterality Date    CARDIAC CATHETERIZATION      normal coronary arteries    COLONOSCOPY      HAND SURGERY Left     HERNIA REPAIR      NM COLONOSCOPY FLX DX W/COLLJ SPEC WHEN PFRMD N/A 3/25/2017    Procedure: COLONOSCOPY;  Surgeon: Markell Pelayo MD;  [facility];   Service: Gastroenterology    TONSILLECTOMY      WISDOM TOOTH EXTRACTION       Social History   Social History     Substance and Sexual Activity   Alcohol Use No     Social History     Substance and Sexual Activity   Drug Use No     Social History     Tobacco Use   Smoking Status Former Smoker    Packs/day: 3 00    Types: Cigarettes    Quit date: 18    Years since quittin 5   Smokeless Tobacco Never Used     Family History   Problem Relation Age of Onset   Levora Dave Cardiomyopathy Mother     Hypertension Mother     Congenital heart disease Sister     Coronary artery disease Brother     Coronary artery disease Family         Meds/Allergies     Current Facility-Administered Medications   Medication Dose Route Frequency    aluminum-magnesium hydroxide-simethicone (MYLANTA) oral suspension 30 mL  30 mL Oral Q6H PRN    atorvastatin (LIPITOR) tablet 20 mg  20 mg Oral HS    calcium carbonate-vitamin D (OSCAL-D) 500 mg-200 units per tablet 1 tablet  1 tablet Oral BID With Meals    FLUoxetine (PROzac) capsule 60 mg  60 mg Oral HS    HYDROmorphone (DILAUDID) injection 0 2 mg  0 2 mg Intravenous Q4H PRN    lactated ringers infusion  125 mL/hr Intravenous Continuous    multivitamin stress formula tablet 1 tablet  1 tablet Oral Daily    ondansetron (ZOFRAN) injection 4 mg  4 mg Intravenous Q6H PRN    oxyCODONE (ROXICODONE) IR tablet 2 5 mg  2 5 mg Oral Q4H PRN    Or    oxyCODONE (ROXICODONE) IR tablet 5 mg  5 mg Oral Q4H PRN    pantoprazole (PROTONIX) EC tablet 40 mg  40 mg Oral Early Morning       No Known Allergies      Objective     Blood pressure 121/73, pulse 71, temperature 98 5 °F (36 9 °C), temperature source Oral, resp  rate 18, height 5' 2 5" (1 588 m), SpO2 94 %  Intake/Output Summary (Last 24 hours) at 6/28/2021 1009  Last data filed at 6/28/2021 0535  Gross per 24 hour   Intake 1310 42 ml   Output 450 ml   Net 860 42 ml         PHYSICAL EXAM:      Physical Exam  Vitals and nursing note reviewed  Constitutional:       Appearance: Normal appearance  HENT:      Head: Normocephalic and atraumatic  Nose: Nose normal       Mouth/Throat:      Mouth: Mucous membranes are moist    Eyes:      Extraocular Movements: Extraocular movements intact  Conjunctiva/sclera: Conjunctivae normal       Pupils: Pupils are equal, round, and reactive to light  Cardiovascular:      Rate and Rhythm: Normal rate and regular rhythm  Pulses: Normal pulses  Heart sounds: Normal heart sounds  Pulmonary:      Effort: Pulmonary effort is normal       Breath sounds: Normal breath sounds  Abdominal:      General: Bowel sounds are normal  There is distension  Palpations: Abdomen is soft  Tenderness: There is abdominal tenderness  Musculoskeletal:         General: Normal range of motion  Cervical back: Normal range of motion  Skin:     General: Skin is warm and dry  Capillary Refill: Capillary refill takes less than 2 seconds  Neurological:      General: No focal deficit present  Mental Status: He is alert and oriented to person, place, and time  Mental status is at baseline  Psychiatric:         Mood and Affect: Mood normal          Behavior: Behavior normal          Thought Content: Thought content normal          Judgment: Judgment normal            Lab Results:   Results from last 7 days   Lab Units 06/28/21  0412   WBC Thousand/uL 10 80*   HEMOGLOBIN g/dL 12 8   HEMATOCRIT % 38 4   PLATELETS Thousands/uL 185   NEUTROS PCT % 73   LYMPHS PCT % 16   MONOS PCT % 9   EOS PCT % 1     Results from last 7 days   Lab Units 06/28/21  0412 06/27/21  1848   POTASSIUM mmol/L 3 9 3 9   CHLORIDE mmol/L 107 103   CO2 mmol/L 29 29   BUN mg/dL 17 21   CREATININE mg/dL 1 08 1 22   CALCIUM mg/dL 8 0* 9 4   ALK PHOS U/L  --  90   ALT U/L  --  33   AST U/L  --  17         Results from last 7 days   Lab Units 06/28/21  0412   LIPASE u/L 39*       Imaging Studies: I have personally reviewed pertinent imaging studies  CTA dissection protocol chest/abdomen/pelvis    Result Date: 6/27/2021  Impression: No evidence of aortic aneurysm or dissection  No intramural hematoma  No infiltrate or pleural effusion  Questionable mild diffuse colonic wall thickening with no significant pericolonic inflammatory stranding which may be secondary to an infectious or inflammatory colitis versus underdistention  The study was limited by the lack of oral contrast   Correlation with patient's symptoms is recommended  No free air or free fluid  Small sliding hiatal hernia  Workstation performed: [id number]       The patient was seen and examined by Dr Antoni Butts, all lynne medical decisions were made with Dr Wendy Khanna  Thank you for allowing us to participate in the care of this pleasant patient    We will follow up with you closely

## 2021-06-28 NOTE — PROGRESS NOTES
[facility]  Progress Note Rubnik Friends 1960, 61 y o  male MRN: [id number]  Unit/Bed#: S -01 Encounter: [id number]  Primary Care Provider: Nelli Warner DO   Date and time admitted to hospital: 6/27/2021  6:04 PM    Abdominal pain  Assessment & Plan  · Presents to ED with bilateral lower quadrant abdominal discomfort with radiation to back  Reports diarrhea prior to arrival   Afebrile without leukocytosis  Hx of hernia repair and prior gastric sleeve  - likely secondary to enteritis/SIBO  · CTA dissection protocol: -mild colitis with panic colitis  · Lipase elevated on admission, trending down  · Abx: Zosyn in ED  Will monitor off further abx  · Check cdiff  · ESR, CRP pending  · Stool enteric panel ordered, will follow-up    Type 2 diabetes mellitus, without long-term current use of insulin ([facility])  Assessment & Plan  Lab Results   Component Value Date    HGBA1C 5 5 06/25/2020     · No longer on metformin  Blood sugars now control status post gastric bypass  · Will recheck A1c and monitor glucose with BMP    * Near syncope  Assessment & Plan  · Presents to ED with near syncope  Complained of cold sweats, sudden onset of abdominal pain with radiation to back  Also reported diarrhea prior to arrival   Suspect near syncopal event secondary to acute pain- vasovagal stimulation  · Initial EKG NSR, HR 65  · Orthostatics negative   · Troponin x3 negative    Elevated lactic acid level-resolved as of 6/28/2021  Assessment & Plan  · Lactate 2 3 on admission, probably due lactic acid a small intestinal bacterial overgrowth status post gastric bypass          VTE Pharmacologic Prophylaxis:   VTE Score: 2 Low Risk (Score 0-2) - Encourage Ambulation  Mechanical VTE Prophylaxis in Place: No    Patient Centered Rounds: I have performed bedside rounds with nursing staff today      Discussions with Specialists or Other Care Team Provider:  Case Management, GI, nursing    Education and Discussions with Family / Patient: Will call patient's family, Dr Ricardo Nails updated patient's family bedside    Current Length of Stay: 0 day(s)    Current Patient Status: Observation     Discharge Plan / Estimated Discharge Date: Anticipate discharge tomorrow to home  Code Status: Level 1 - Full Code      Subjective:   Patient stated he had abdominal pain /10, no nausea or bowel movement  No lightheadedness or dizziness, chest pain  Objective:     Vitals:   Temp (24hrs), Av 4 °F (36 9 °C), Min:98 1 °F (36 7 °C), Max:98 5 °F (36 9 °C)    Temp:  [98 1 °F (36 7 °C)-98 5 °F (36 9 °C)] 98 5 °F (36 9 °C)  HR:  [61-76] 71  Resp:  [14-18] 18  BP: (118-171)/(60-90) 121/73  SpO2:  [94 %-98 %] 94 %  Body mass index is 34 92 kg/m²  Input and Output Summary (last 24 hours): Intake/Output Summary (Last 24 hours) at 2021 1303  Last data filed at 2021 1259  Gross per 24 hour   Intake 1310 42 ml   Output 1075 ml   Net 235 42 ml       Physical Exam:     Physical Exam  Constitutional:       Appearance: He is obese  HENT:      Head: Normocephalic and atraumatic  Mouth/Throat:      Mouth: Mucous membranes are moist       Pharynx: Oropharynx is clear  Eyes:      General: No scleral icterus  Right eye: No discharge  Left eye: No discharge  Extraocular Movements: Extraocular movements intact  Conjunctiva/sclera: Conjunctivae normal    Cardiovascular:      Rate and Rhythm: Normal rate and regular rhythm  Pulses: Normal pulses  Heart sounds: Normal heart sounds  Pulmonary:      Effort: Pulmonary effort is normal  No respiratory distress  Abdominal:      General: Bowel sounds are normal  There is no distension  Palpations: Abdomen is soft  Tenderness: There is abdominal tenderness (Mild, generalized)  There is no right CVA tenderness, left CVA tenderness, guarding or rebound  Hernia: No hernia is present     Skin:     General: Skin is warm and dry       Capillary Refill: Capillary refill takes less than 2 seconds  Neurological:      General: No focal deficit present  Mental Status: He is alert and oriented to person, place, and time  Additional Data:     Labs:  Results from last 7 days   Lab Units 06/28/21  0412   WBC Thousand/uL 10 80*   HEMOGLOBIN g/dL 12 8   HEMATOCRIT % 38 4   PLATELETS Thousands/uL 185   NEUTROS PCT % 73   LYMPHS PCT % 16   MONOS PCT % 9   EOS PCT % 1     Results from last 7 days   Lab Units 06/28/21  0412 06/27/21  1848   SODIUM mmol/L 141 139   POTASSIUM mmol/L 3 9 3 9   CHLORIDE mmol/L 107 103   CO2 mmol/L 29 29   BUN mg/dL 17 21   CREATININE mg/dL 1 08 1 22   ANION GAP mmol/L 5 7   CALCIUM mg/dL 8 0* 9 4   ALBUMIN g/dL  --  3 7   TOTAL BILIRUBIN mg/dL  --  0 43   ALK PHOS U/L  --  90   ALT U/L  --  33   AST U/L  --  17   GLUCOSE RANDOM mg/dL 103 187*                 Results from last 7 days   Lab Units 06/27/21  2134 06/27/21  1848   LACTIC ACID mmol/L 0 9 2 3*       Imaging: Reviewed radiology reports from this admission including: abdominal/pelvic CT scan    Recent Cultures (last 7 days):     Results from last 7 days   Lab Units 06/27/21  1848   BLOOD CULTURE  Received in Microbiology Lab  Culture in Progress  Received in Microbiology Lab  Culture in Progress         Lines/Drains:  Invasive Devices     Peripheral Intravenous Line            Peripheral IV 06/27/21 Left Forearm <1 day                Telemetry:        Last 24 Hours Medication List:   Current Facility-Administered Medications   Medication Dose Route Frequency Provider Last Rate    aluminum-magnesium hydroxide-simethicone  30 mL Oral Q6H PRN Rosalina Coupe, CRNP      atorvastatin  20 mg Oral HS Rosalina Coupe, CRNP      calcium carbonate-vitamin D  1 tablet Oral BID With Meals Rosalina Coupe, CRNP      dicyclomine  20 mg Oral 4x Daily (AC & HS) Lisandro Myers MD      FLUoxetine  60 mg Oral HS Rosalina Coupe, CRNP      HYDROmorphone  0 2 mg Intravenous Q4H PRN Bogue Chitto Lush, CRNP      multivitamin stress formula  1 tablet Oral Daily Bogue Chitto Lush, CRNP      ondansetron  4 mg Intravenous Q6H PRN Bogue Chitto Lush, CRNP      oxyCODONE  2 5 mg Oral Q4H PRN Bogue Chitto Lush, CRNP      Or    oxyCODONE  5 mg Oral Q4H PRN Bogue Chitto Lush, CRNP      pantoprazole  40 mg Oral Early Morning Bogue Chitto Soniash, CRNP          Today, Patient Was Seen By: Norma Pagan MD    ** Please Note: This note has been constructed using a voice recognition system   **

## 2021-06-28 NOTE — UTILIZATION REVIEW
Initial Clinical Review    Admission: Date/Time/Statement:     Admission Orders (From admission, onward)     Ordered        06/27/21 2145  Place in Observation  Once                Orders Placed This Encounter   Procedures    Place in Observation     Standing Status:   Standing     Number of Occurrences:   1     Order Specific Question:   Level of Care     Answer:   Med Surg [16]     ED Arrival Information     Expected Arrival Acuity    - 6/27/2021 18:04 Urgent    Means of arrival Escorted by Service Admission type    [facility] Urgent    Arrival complaint    Back pain     Chief Complaint   Patient presents with    Abdominal Pain     pt arrives by EMS after near syncope  pt states he was laying down when suddenly he had cold sweats and almosted blacked out  pt states afterwards he had sudden onset of abd and lower back pain  Initial Presentation:   · 62 y/o male with PMHx  Obesity s/p gastric sleeve, hernia repair, Bladder Cancer s/p BCG, DM 2  Presented via EMS to [facility] ED on 6/27/21 2nd near-Syncope then 10/10 bilateral lower quadrant abdominal pain radiating to back  Reported feeling cold and clammy prior to episode  In ED - Temp 98 1  HR 63  /90   Exam + generalized abdominal tenderness  CT abdomen showed questionable mild diffuse colonic wall thickening possibly secondary to an infectious or inflammatory colitis versus under distention   Labs:  CBC + BMP wnl      Lact acid 2 3  Lipase 595   Ed Tx: IVF NSS x 1 L, IV zosyn, IV Protonix, IV Dilaudid, IV Zofran in ED    Placed in Observation 6/27/21 at 2145 2nd near-Syncope + abdominal pain - continue IVF, trend troponin, serial EKG, telemetry monitoring, Consult GI      6/28/21 - Day 2:          ED Triage Vitals   Temperature Pulse Respirations Blood Pressure SpO2   06/27/21 1811 06/27/21 1808 06/27/21 1808 06/27/21 1808 06/27/21 1808   98 1 °F (36 7 °C) 63 18 (!) 171/90 98 %      Temp Source Heart Rate Source Patient Position - Orthostatic VS BP Location FiO2 (%)   06/27/21 1811 06/27/21 1808 06/27/21 1808 06/27/21 1930 --   Oral Monitor Lying Left arm       Pain Score       06/27/21 1840       Worst Possible Pain          Wt Readings from Last 1 Encounters:   04/28/21 88 kg (194 lb)     Additional Vital Signs:   06/28/21 0659  98 5 °F (36 9 °C)  71  18  120/68  --  94 %  None (Room air)  Lying - Orthostatic VS   06/27/21 2335  98 4 °F (36 9 °C)  61  18  126/68  --  96 %  None (Room air)  Lying   06/27/21 2313  98 5 °F (36 9 °C)  67  18  122/68  --  94 %  None (Room air)  Lying   06/27/21 2030  --  74  14  129/60  87  94 %  None (Room air)  Lying   06/27/21 1930  --  76  14  136/75  100  98 %  None (Room air)  Lying     I/O 06/27 0701   06/28 0700   P  O  240   I V  877 1   IV Piggyback 193 3   Total Intake 1310 4   Urine 450   Stool 0   Total Output 450   Net +860 4       Unmeasured Stool Occurrence 1 x     Pertinent Labs/Diagnostic Test Results:     Results from last 7 days   Lab Units 06/28/21  0412 06/27/21  1848   WBC Thousand/uL 10 80* 9 70   HEMOGLOBIN g/dL 12 8 14 4   HEMATOCRIT % 38 4 42 8   PLATELETS Thousands/uL 185 239   NEUTROS ABS Thousands/µL 7 89* 6 90       Results from last 7 days   Lab Units 06/28/21  0412 06/27/21  1848   SODIUM mmol/L 141 139   POTASSIUM mmol/L 3 9 3 9   CHLORIDE mmol/L 107 103   CO2 mmol/L 29 29   ANION GAP mmol/L 5 7   BUN mg/dL 17 21   CREATININE mg/dL 1 08 1 22   EGFR ml/min/1 73sq m 74 64   CALCIUM mg/dL 8 0* 9 4   MAGNESIUM mg/dL  --  2 1     Results from last 7 days   Lab Units 06/27/21  1848   AST U/L 17   ALT U/L 33   ALK PHOS U/L 90   TOTAL PROTEIN g/dL 7 2   ALBUMIN g/dL 3 7   TOTAL BILIRUBIN mg/dL 0 43       Results from last 7 days   Lab Units 06/28/21  0412 06/27/21  1848   GLUCOSE RANDOM mg/dL 103 187*     Results from last 7 days   Lab Units 06/28/21  0403 06/28/21  0122 06/27/21  2232 06/27/21  1848   TROPONIN I ng/mL <0 02 <0 02 <0 02 <0 02 Results from last 7 days   Lab Units 06/27/21  2134 06/27/21  1848   LACTIC ACID mmol/L 0 9 2 3*       Results from last 7 days   Lab Units 06/28/21  0412 06/27/21  1848   LIPASE u/L 39* 595*       Results from last 7 days   Lab Units 06/27/21  2018   CLARITY UA  Clear   COLOR UA  Yellow   SPEC GRAV UA  1 010   PH UA  6 5   GLUCOSE UA mg/dl Negative   KETONES UA mg/dl Negative   BLOOD UA  Small*   PROTEIN UA mg/dl Negative   NITRITE UA  Negative   BILIRUBIN UA  Negative   UROBILINOGEN UA E U /dl 0 2   LEUKOCYTES UA  Negative   WBC UA /hpf 1-2   RBC UA /hpf 2-4   BACTERIA UA /hpf Occasional   EPITHELIAL CELLS WET PREP /hpf None Seen     Results from last 7 days   Lab Units 06/27/21  1848   BLOOD CULTURE  Received in Microbiology Lab  Culture in Progress  Received in Microbiology Lab  Culture in Progress  CTA - CHEST, ABDOMEN AND PELVIS - WITHOUT AND WITH IV CONTRAST   No evidence of aortic aneurysm or dissection   No intramural hematoma  No infiltrate or pleural effusion  Questionable mild diffuse colonic wall thickening with no significant pericolonic inflammatory stranding which may be secondary to an infectious or inflammatory colitis versus underdistention   The study was limited by the lack of oral contrast    Correlation with patient's symptoms is recommended  No free air or free fluid  Small sliding hiatal hernia       ED Treatment:   Medication Administration from 06/27/2021 1803 to 06/27/2021 2315       Date/Time Order Dose Route Action     06/27/2021 1840 ondansetron (ZOFRAN) injection 4 mg 4 mg Intravenous Given     06/27/2021 1840 HYDROmorphone (DILAUDID) injection 0 5 mg 0 5 mg Intravenous Given     06/27/2021 1849 sodium chloride 0 9 % bolus 1,000 mL 1,000 mL Intravenous New Bag     06/27/2021 1854 iohexol (OMNIPAQUE) 350 MG/ML injection (SINGLE-DOSE) 100 mL 100 mL Intravenous Given     06/27/2021 2021 HYDROmorphone (DILAUDID) injection 1 mg 1 mg Intravenous Given     06/27/2021 2134 piperacillin-tazobactam (ZOSYN) 3 375 g in sodium chloride 0 9 % 100 mL IVPB 3 375 g Intravenous New Bag     06/27/2021 2237 pantoprazole (PROTONIX) injection 40 mg 40 mg Intravenous Given     06/27/2021 2234 lactated ringers infusion 125 mL/hr Intravenous New Bag     Past Medical History:   Diagnosis Date    Atypical chest pain     [facility]     CN IV nerve palsy, right eye     Diabetes mellitus [facility]     Hyperlipidemia     IFG (impaired fasting glucose)     OCD (obsessive compulsive disorder)     Snorings      Admitting Diagnosis: Diarrhea [R19 7]  Back pain [M54 9]  Colitis [K52 9]  Syncope [R55]  Intractable abdominal pain [R10 9]    Age/Sex: 61 y o  male    Admission Orders:  Telemetry  VS + Neuro checks q4hrs  Orthostatic BP X 1  Continuous Pulse Oximetry  Up  With assistance  Clear Liquid Diet  I+O q shift  Daily weight  Repeat EKG in am  Serial Troponin q3hrs x 3    Scheduled Medications:  atorvastatin, 20 mg, Oral, HS  calcium carbonate-vitamin D, 1 tablet, Oral, BID With Meals  FLUoxetine, 60 mg, Oral, HS  multivitamin stress formula, 1 tablet, Oral, Daily  pantoprazole, 40 mg, Oral, Early Morning    Continuous IV Infusions:  IVF lactated ringers, 125 mL/hr, Intravenous, Continuous    PRN Meds:  aluminum-magnesium hydroxide-simethicone, 30 mL, Oral, Q6H PRN  HYDROmorphone, 0 2 mg, Intravenous, Q4H PRN  IV ondansetron, 4 mg, Intravenous, Q6H PRN GIVEN X 1  oxyCODONE, 2 5 mg, Oral, Q4H PRN  oxyCODONE, 5 mg, Oral, Q4H PRN GIVEN X 3    IP CONSULT TO GASTROENTEROLOGY    Network Utilization Review Department  ATTENTION: Please call with any questions or concerns to [phone] and carefully listen to the prompts so that you are directed to the right person  All voicemails are confidential   [facility] all requests for admission clinical reviews, approved or denied determinations and any other requests to dedicated fax number below belonging to the campus where the patient is receiving treatment  List of dedicated fax numbers for the Facilities:  [address] DENIALS (Administrative/Medical Necessity) [phone]   [address] (Maternity/NICU/Pediatrics) [phone] [address] Dr Rajesh Corea [phone] [place] [address] [address] Oscar Robles [address] [place] [address] [phone]

## 2021-06-28 NOTE — ASSESSMENT & PLAN NOTE
· Presents to ED with bilateral lower quadrant abdominal discomfort with radiation to back  Reports diarrhea prior to arrival   Afebrile without leukocytosis  Hx of hernia repair and prior gastric sleeve  · CTA dissection protocol:   · Questionable mild diffuse colonic wall thickening with no significant pericolonic inflammatory stranding which may be secondary to an infectious or inflammatory colitis versus underdistention  · Diffuse fatty infiltration of pancreas  · Mild haziness of the mesenteric root fat with a few nonenlarged mesenteric root lymph nodes suggestive of a chronic mesenteric panniculitis  · Lipase 595  · Abx: Zosyn in ED  Will monitor off further abx    · Check cdiff   · Recheck lipase in AM  · Check triglycerides  · IV fluids, NPO sips of clears  · GI consult in AM

## 2021-06-28 NOTE — ASSESSMENT & PLAN NOTE
· Presents to ED with bilateral lower quadrant abdominal discomfort with radiation to back  Reports diarrhea prior to arrival   Afebrile without leukocytosis  Hx of hernia repair and prior gastric sleeve  - likely secondary to enteritis/SIBO  · CTA dissection protocol: -mild colitis with panic colitis  · Lipase elevated on admission, trending down  · Abx: Zosyn in ED  Will monitor off further abx    · Check cdiff  · ESR, CRP pending  · Stool enteric panel ordered, will follow-up

## 2021-06-28 NOTE — H&P
[id number] 1960, 61 y o  male MRN: [id number]  Unit/Bed#: S -01 Encounter: [id number]  Primary Care Provider: Helio Sam DO   Date and time admitted to hospital: 6/27/2021  6:04 PM    * Near syncope  Assessment & Plan  · Presents to ED with near syncope  Complained of cold sweats, sudden onset of abdominal pain with radiation to back  Also reported diarrhea prior to arrival   Suspect near syncopal event secondary to acute pain  · Initial EKG NSR, HR 65  · IV fluids overnight  · Check ortho BP   · Trend troponin, serial EKG, telemetry monitoring, fall precautions     Abdominal pain  Assessment & Plan  · Presents to ED with bilateral lower quadrant abdominal discomfort with radiation to back  Reports diarrhea prior to arrival   Afebrile without leukocytosis  Hx of hernia repair and prior gastric sleeve  · CTA dissection protocol:   · Questionable mild diffuse colonic wall thickening with no significant pericolonic inflammatory stranding which may be secondary to an infectious or inflammatory colitis versus underdistention  · Diffuse fatty infiltration of pancreas  · Mild haziness of the mesenteric root fat with a few nonenlarged mesenteric root lymph nodes suggestive of a chronic mesenteric panniculitis  · Lipase 595  · Abx: Zosyn in ED  Will monitor off further abx  · Check cdiff   · Recheck lipase in AM  · Check triglycerides  · IV fluids, NPO sips of clears  · GI consult in AM    Elevated lactic acid level  Assessment & Plan  · Lactate 2 3 on admission  · IV fluids and trend until <2    Type 2 diabetes mellitus, without long-term current use of insulin (HCC)  Assessment & Plan  Lab Results   Component Value Date    HGBA1C 5 5 06/25/2020     · No longer on metformin    Blood sugars now control status post gastric bypass  · Will recheck A1c and monitor glucose with BMP    VTE Pharmacologic Prophylaxis: VTE Score: 2 Low Risk (Score 0-2) - Encourage Ambulation  Code Status: Level 1 - Full Code   Discussion with family: Patient declined call to   Anticipated Length of Stay: Patient will be admitted on an observation basis with an anticipated length of stay of less than 2 midnights secondary to pain control   Total Time for Visit, including Counseling / Coordination of Care: 70 minutes Greater than 50% of this total time spent on direct patient counseling and coordination of care  Chief Complaint: abdominal pain, near syncope     History of Present Illness:  Lor Epperson is a 61 y o  male with a PMH of gastric sleeve, hernia repair, bladder ca s/p BCG, DM 2,  who presents with bilateral lower quadrant abdominal pain and near syncopal event  Reports pain radiates to back  He reports feeling cold and clammy prior to episode  He did have diarrhea prior to arrival   He denies fevers  Denies chest pain, SOB, vomiting, urinary symptoms, calf tenderness or swelling  He received one dose of Zosyn in ED, will monitor off further abx as patient is afebrile without leukocytosis  Patient is admitted as observation  Will consult GI  Review of Systems:  Review of Systems   Constitutional: Positive for diaphoresis  Negative for chills and fever  Gastrointestinal: Positive for abdominal pain and diarrhea  Musculoskeletal: Positive for back pain  Neurological:        Near syncope   All other systems reviewed and are negative        Past Medical and Surgical History:   Past Medical History:   Diagnosis Date    Atypical chest pain     Bladder cancer ([facility] [address] )     CN IV nerve palsy, right eye     Diabetes mellitus ([facility] [address] )     Hyperlipidemia     IFG (impaired fasting glucose)     OCD (obsessive compulsive disorder)     Snorings        Past Surgical History:   Procedure Laterality Date    CARDIAC CATHETERIZATION  2011    normal coronary arteries    COLONOSCOPY      HAND SURGERY Left     HERNIA REPAIR      [place] COLONOSCOPY FLX DX W/COLLJ SPEC WHEN PFRMD N/A 3/25/2017    Procedure: COLONOSCOPY;  Surgeon: Laurie Dunbar MD;  [facility]; Service: Gastroenterology    TONSILLECTOMY      WISDOM TOOTH EXTRACTION         Meds/Allergies:  Prior to Admission medications    Medication Sig Start Date End Date Taking? Authorizing Provider   aspirin (ECOTRIN LOW STRENGTH) 81 mg EC tablet Take 81 mg by mouth daily    Historical Provider, MD   atorvastatin (LIPITOR) 20 mg tablet TAKE 1 TABLET DAILY 18   Sanju Can MD   Calcium Carb-Cholecalciferol (CALCIUM CARBONATE-VITAMIN D3 PO) Take by mouth Three times a day    Historical Provider, MD   Cyanocobalamin (VITAMIN B 12 PO) Take 1 tablet by mouth    Historical Provider, MD   FLUoxetine (PROzac) 20 mg capsule TAKE 3 CAPSULES DAILY 18   Sanju Can MD   metFORMIN (GLUCOPHAGE) 500 mg tablet Take 500 mg by mouth daily with breakfast    Historical Provider, MD   Multiple Vitamins-Iron (MULTIVITAMIN/IRON PO) Take by mouth 2 (two) times a day    Historical Provider, MD   omeprazole (PriLOSEC) 20 mg delayed release capsule as needed 20   Historical Provider, MD   phentermine 37 5 MG capsule Take 37 5 mg by mouth every morning    Historical Provider, MD   topiramate (TOPAMAX) 25 mg tablet Take 50 mg by mouth daily    Historical Provider, MD   trihexyphenidyl (ARTANE) 2 mg tablet Take 1 tablet (2 mg total) by mouth daily 1 tab daily 10/22/20 4/28/21  Wyatt Cruz MD     I have reviewed home medications with patient personally      Allergies: No Known Allergies    Social History:  Marital Status: /Civil Union     Patient Pre-hospital Living Situation: Home  Patient Pre-hospital Level of Mobility: walks    Substance Use History:   Social History     Substance and Sexual Activity   Alcohol Use No     Social History     Tobacco Use   Smoking Status Former Smoker    Packs/day: 3 00    Types: Cigarettes    Quit date: 18    Years since quittin 5   Smokeless Tobacco Never Used     Social History     Substance and Sexual Activity   Drug Use No       Family History:  Family History   Problem Relation Age of Onset    Cardiomyopathy Mother     Hypertension Mother     Congenital heart disease Sister     Coronary artery disease Brother     Coronary artery disease Family        Physical Exam:     Vitals:   Blood Pressure: 122/68 (06/27/21 2313)  Pulse: 67 (06/27/21 2313)  Temperature: 98 5 °F (36 9 °C) (06/27/21 2313)  Temp Source: Oral (06/27/21 2313)  Respirations: 18 (06/27/21 2313)  Height: 5' 2 5" (158 8 cm) (06/27/21 2313)  SpO2: 94 % (06/27/21 2313)    Physical Exam  Constitutional:       General: He is not in acute distress  Appearance: Normal appearance  He is not ill-appearing  HENT:      Head: Normocephalic  Right Ear: External ear normal       Left Ear: External ear normal       Nose: Nose normal       Mouth/Throat:      Mouth: Mucous membranes are moist       Pharynx: Oropharynx is clear  Eyes:      Extraocular Movements: Extraocular movements intact  Conjunctiva/sclera: Conjunctivae normal    Cardiovascular:      Rate and Rhythm: Normal rate and regular rhythm  Pulses: Normal pulses  Heart sounds: Normal heart sounds  No murmur heard  No friction rub  Pulmonary:      Effort: Pulmonary effort is normal       Breath sounds: Normal breath sounds  Abdominal:      General: Bowel sounds are normal  There is no distension  Palpations: Abdomen is soft  Tenderness: There is abdominal tenderness (Bilateral lower quadrants)  There is no right CVA tenderness, left CVA tenderness, guarding or rebound  Musculoskeletal:         General: Normal range of motion  Cervical back: Normal range of motion  No rigidity  Right lower leg: No edema  Left lower leg: No edema  Skin:     General: Skin is warm and dry  Capillary Refill: Capillary refill takes less than 2 seconds     Neurological:      General: No focal deficit present  Mental Status: He is oriented to person, place, and time  Psychiatric:         Mood and Affect: Mood normal          Behavior: Behavior normal          Additional Data:     Lab Results:  Results from last 7 days   Lab Units 06/27/21  1848   WBC Thousand/uL 9 70   HEMOGLOBIN g/dL 14 4   HEMATOCRIT % 42 8   PLATELETS Thousands/uL 239   NEUTROS PCT % 72   LYMPHS PCT % 18   MONOS PCT % 8   EOS PCT % 1     Results from last 7 days   Lab Units 06/27/21  1848   SODIUM mmol/L 139   POTASSIUM mmol/L 3 9   CHLORIDE mmol/L 103   CO2 mmol/L 29   BUN mg/dL 21   CREATININE mg/dL 1 22   ANION GAP mmol/L 7   CALCIUM mg/dL 9 4   ALBUMIN g/dL 3 7   TOTAL BILIRUBIN mg/dL 0 43   ALK PHOS U/L 90   ALT U/L 33   AST U/L 17   GLUCOSE RANDOM mg/dL 187*                 Results from last 7 days   Lab Units 06/27/21  2134 06/27/21  1848   LACTIC ACID mmol/L 0 9 2 3*       Imaging: Reviewed radiology reports from this admission including: chest CT scan  CTA dissection protocol chest/abdomen/pelvis   Final Result by Rain Noyola MD (06/27 2018)      No evidence of aortic aneurysm or dissection  No intramural hematoma  No infiltrate or pleural effusion  Questionable mild diffuse colonic wall thickening with no significant pericolonic inflammatory stranding which may be secondary to an infectious or inflammatory colitis versus underdistention  The study was limited by the lack of oral contrast      Correlation with patient's symptoms is recommended  No free air or free fluid  Small sliding hiatal hernia  Workstation performed: [id number]             EKG and Other Studies Reviewed on Admission:   · EKG: NSR  HR 63     ** Please Note: This note has been constructed using a voice recognition system   **

## 2021-06-28 NOTE — ASSESSMENT & PLAN NOTE
· Presents to ED with near syncope  Complained of cold sweats, sudden onset of abdominal pain with radiation to back  Also reported diarrhea prior to arrival   Suspect near syncopal event secondary to acute pain- vasovagal stimulation      · Initial EKG NSR, HR 65  · Orthostatics negative   · Troponin x3 negative

## 2021-06-28 NOTE — ASSESSMENT & PLAN NOTE
Lab Results   Component Value Date    HGBA1C 5 5 06/25/2020     · No longer on metformin    Blood sugars now control status post gastric bypass  · Will recheck A1c and monitor glucose with BMP

## 2021-06-28 NOTE — PLAN OF CARE
Problem: PAIN - ADULT  Goal: Verbalizes/displays adequate comfort level or baseline comfort level  Description: Interventions:  - Encourage patient to monitor pain and request assistance  - Assess pain using appropriate pain scale  - Administer analgesics based on type and severity of pain and evaluate response  - Implement non-pharmacological measures as appropriate and evaluate response  - Consider cultural and social influences on pain and pain management  - Notify physician/advanced practitioner if interventions unsuccessful or patient reports new pain  Outcome: Progressing     Problem: INFECTION - ADULT  Goal: Absence or prevention of progression during hospitalization  Description: INTERVENTIONS:  - Assess and monitor for signs and symptoms of infection  - Monitor lab/diagnostic results  - Monitor all insertion sites, i e  indwelling lines, tubes, and drains  - Monitor endotracheal if appropriate and nasal secretions for changes in amount and color  - Liguori appropriate cooling/warming therapies per order  - Administer medications as ordered  - Instruct and encourage patient and family to use good hand hygiene technique  - Identify and instruct in appropriate isolation precautions for identified infection/condition  Outcome: Progressing     Problem: SAFETY ADULT  Goal: Patient will remain free of falls  Description: INTERVENTIONS:  - Educate patient/family on patient safety including physical limitations  - Instruct patient to call for assistance with activity   - Consult OT/PT to assist with strengthening/mobility   - Keep Call bell within reach  - Keep bed low and locked with side rails adjusted as appropriate  - Keep care items and personal belongings within reach  - Initiate and maintain comfort rounds  - Make Fall Risk Sign visible to staff  - Offer Toileting every 2 Hours, in advance of need  - Initiate/Maintain bed alarm  - Obtain necessary fall risk management equipment: alarms  - Apply yellow socks and bracelet for high fall risk patients  - Consider moving patient to room near nurses station  Outcome: Progressing  Goal: Maintain or return to baseline ADL function  Description: INTERVENTIONS:  -  Assess patient's ability to carry out ADLs; assess patient's baseline for ADL function and identify physical deficits which impact ability to perform ADLs (bathing, care of mouth/teeth, toileting, grooming, dressing, etc )  - Assess/evaluate cause of self-care deficits   - Assess range of motion  - Assess patient's mobility; develop plan if impaired  - Assess patient's need for assistive devices and provide as appropriate  - Encourage maximum independence but intervene and supervise when necessary  - Involve family in performance of ADLs  - Assess for home care needs following discharge   - Consider OT consult to assist with ADL evaluation and planning for discharge  - Provide patient education as appropriate  Outcome: Progressing  Goal: Maintains/Returns to pre admission functional level  Description: INTERVENTIONS:  - Perform BMAT or MOVE assessment daily    - Set and communicate daily mobility goal to care team and patient/family/caregiver  - Collaborate with rehabilitation services on mobility goals if consulted  - Perform Range of Motion 4 times a day  - Reposition patient every 4 hours    - Dangle patient 4 times a day  - Stand patient 4 times a day  - Ambulate patient 4 times a day  - Out of bed to chair 3 times a day   - Out of bed for meals 3 times a day  - Out of bed for toileting  - Record patient progress and toleration of activity level   Outcome: Progressing     Problem: DISCHARGE PLANNING  Goal: Discharge to home or other facility with appropriate resources  Description: INTERVENTIONS:  - Identify barriers to discharge w/patient and caregiver  - Arrange for needed discharge resources and transportation as appropriate  - Identify discharge learning needs (meds, wound care, etc )  - Arrange for interpretive services to assist at discharge as needed  - Refer to Case Management Department for coordinating discharge planning if the patient needs post-hospital services based on physician/advanced practitioner order or complex needs related to functional status, cognitive ability, or social support system  Outcome: Progressing     Problem: Knowledge Deficit  Goal: Patient/family/caregiver demonstrates understanding of disease process, treatment plan, medications, and discharge instructions  Description: Complete learning assessment and assess knowledge base    Interventions:  - Provide teaching at level of understanding  - Provide teaching via preferred learning methods  Outcome: Progressing     Problem: GASTROINTESTINAL - ADULT  Goal: Minimal or absence of nausea and/or vomiting  Description: INTERVENTIONS:  - Administer IV fluids if ordered to ensure adequate hydration  - Maintain NPO status until nausea and vomiting are resolved  - Nasogastric tube if ordered  - Administer ordered antiemetic medications as needed  - Provide nonpharmacologic comfort measures as appropriate  - Advance diet as tolerated, if ordered  - Consider nutrition services referral to assist patient with adequate nutrition and appropriate food choices  Outcome: Progressing  Goal: Maintains or returns to baseline bowel function  Description: INTERVENTIONS:  - Assess bowel function  - Encourage oral fluids to ensure adequate hydration  - Administer IV fluids if ordered to ensure adequate hydration  - Administer ordered medications as needed  - Encourage mobilization and activity  - Consider nutritional services referral to assist patient with adequate nutrition and appropriate food choices  Outcome: Progressing  Goal: Maintains adequate nutritional intake  Description: INTERVENTIONS:  - Monitor percentage of each meal consumed  - Identify factors contributing to decreased intake, treat as appropriate  - Assist with meals as needed  - Monitor I&O, weight, and lab values if indicated  - Obtain nutrition services referral as needed  Outcome: Progressing  Goal: Establish and maintain optimal ostomy function  Description: INTERVENTIONS:  - Assess bowel function  - Encourage oral fluids to ensure adequate hydration  - Administer IV fluids if ordered to ensure adequate hydration   - Administer ordered medications as needed  - Encourage mobilization and activity  - Nutrition services referral to assist patient with appropriate food choices  - Assess stoma site  - Consider wound care consult   Outcome: Progressing  Goal: Oral mucous membranes remain intact  Description: INTERVENTIONS  - Assess oral mucosa and hygiene practices  - Implement preventative oral hygiene regimen  - Implement oral medicated treatments as ordered  - Initiate Nutrition services referral as needed  Outcome: Progressing     Problem: Potential for Falls  Goal: Patient will remain free of falls  Description: INTERVENTIONS:  - Educate patient/family on patient safety including physical limitations  - Instruct patient to call for assistance with activity   - Consult OT/PT to assist with strengthening/mobility   - Keep Call bell within reach  - Keep bed low and locked with side rails adjusted as appropriate  - Keep care items and personal belongings within reach  - Initiate and maintain comfort rounds  - Make Fall Risk Sign visible to staff  - Offer Toileting every 2 Hours, in advance of need  - Initiate/Maintain bed alarm  - Obtain necessary fall risk management equipment: alarms  - Apply yellow socks and bracelet for high fall risk patients  - Consider moving patient to room near nurses station  Outcome: Progressing

## 2021-06-29 ENCOUNTER — ANESTHESIA (OUTPATIENT)
Dept: GASTROENTEROLOGY | Facility: HOSPITAL | Age: 61
End: 2021-06-29
Payer: COMMERCIAL

## 2021-06-29 ENCOUNTER — ANESTHESIA EVENT (OUTPATIENT)
Dept: GASTROENTEROLOGY | Facility: HOSPITAL | Age: 61
End: 2021-06-29
Payer: COMMERCIAL

## 2021-06-29 ENCOUNTER — APPOINTMENT (OUTPATIENT)
Dept: GASTROENTEROLOGY | Facility: HOSPITAL | Age: 61
End: 2021-06-29
Payer: COMMERCIAL

## 2021-06-29 LAB
ALBUMIN SERPL BCP-MCNC: 2.8 G/DL (ref 3.5–5)
ALP SERPL-CCNC: 71 U/L (ref 46–116)
ALT SERPL W P-5'-P-CCNC: 24 U/L (ref 12–78)
ANION GAP SERPL CALCULATED.3IONS-SCNC: 5 MMOL/L (ref 4–13)
AST SERPL W P-5'-P-CCNC: 13 U/L (ref 5–45)
ATRIAL RATE: 61 BPM
BASOPHILS # BLD AUTO: 0.02 THOUSANDS/ΜL (ref 0–0.1)
BASOPHILS NFR BLD AUTO: 0 % (ref 0–1)
BILIRUB SERPL-MCNC: 0.74 MG/DL (ref 0.2–1)
BUN SERPL-MCNC: 11 MG/DL (ref 5–25)
CALCIUM ALBUM COR SERPL-MCNC: 9 MG/DL (ref 8.3–10.1)
CALCIUM SERPL-MCNC: 8 MG/DL (ref 8.3–10.1)
CHLORIDE SERPL-SCNC: 106 MMOL/L (ref 100–108)
CO2 SERPL-SCNC: 31 MMOL/L (ref 21–32)
CREAT SERPL-MCNC: 1.06 MG/DL (ref 0.6–1.3)
EOSINOPHIL # BLD AUTO: 0.23 THOUSAND/ΜL (ref 0–0.61)
EOSINOPHIL NFR BLD AUTO: 3 % (ref 0–6)
ERYTHROCYTE [DISTWIDTH] IN BLOOD BY AUTOMATED COUNT: 12.7 % (ref 11.6–15.1)
GFR SERPL CREATININE-BSD FRML MDRD: 76 ML/MIN/1.73SQ M
GLUCOSE P FAST SERPL-MCNC: 93 MG/DL (ref 65–99)
GLUCOSE SERPL-MCNC: 93 MG/DL (ref 65–140)
HCT VFR BLD AUTO: 37.8 % (ref 36.5–49.3)
HGB BLD-MCNC: 12.3 G/DL (ref 12–17)
IMM GRANULOCYTES # BLD AUTO: 0.03 THOUSAND/UL (ref 0–0.2)
IMM GRANULOCYTES NFR BLD AUTO: 0 % (ref 0–2)
LYMPHOCYTES # BLD AUTO: 2.21 THOUSANDS/ΜL (ref 0.6–4.47)
LYMPHOCYTES NFR BLD AUTO: 24 % (ref 14–44)
MCH RBC QN AUTO: 30.4 PG (ref 26.8–34.3)
MCHC RBC AUTO-ENTMCNC: 32.5 G/DL (ref 31.4–37.4)
MCV RBC AUTO: 93 FL (ref 82–98)
MONOCYTES # BLD AUTO: 0.69 THOUSAND/ΜL (ref 0.17–1.22)
MONOCYTES NFR BLD AUTO: 7 % (ref 4–12)
NEUTROPHILS # BLD AUTO: 6.14 THOUSANDS/ΜL (ref 1.85–7.62)
NEUTS SEG NFR BLD AUTO: 66 % (ref 43–75)
NRBC BLD AUTO-RTO: 0 /100 WBCS
P AXIS: 35 DEGREES
PLATELET # BLD AUTO: 178 THOUSANDS/UL (ref 149–390)
PMV BLD AUTO: 9.6 FL (ref 8.9–12.7)
POTASSIUM SERPL-SCNC: 4 MMOL/L (ref 3.5–5.3)
PR INTERVAL: 142 MS
PROT SERPL-MCNC: 5.9 G/DL (ref 6.4–8.2)
QRS AXIS: 39 DEGREES
QRSD INTERVAL: 88 MS
QT INTERVAL: 420 MS
QTC INTERVAL: 422 MS
RBC # BLD AUTO: 4.05 MILLION/UL (ref 3.88–5.62)
SODIUM SERPL-SCNC: 142 MMOL/L (ref 136–145)
T WAVE AXIS: 24 DEGREES
VENTRICULAR RATE: 61 BPM
WBC # BLD AUTO: 9.32 THOUSAND/UL (ref 4.31–10.16)

## 2021-06-29 PROCEDURE — 88305 TISSUE EXAM BY PATHOLOGIST: CPT | Performed by: PATHOLOGY

## 2021-06-29 PROCEDURE — 99225 PR SBSQ OBSERVATION CARE/DAY 25 MINUTES: CPT | Performed by: INTERNAL MEDICINE

## 2021-06-29 PROCEDURE — 80053 COMPREHEN METABOLIC PANEL: CPT | Performed by: INTERNAL MEDICINE

## 2021-06-29 PROCEDURE — 85025 COMPLETE CBC W/AUTO DIFF WBC: CPT | Performed by: INTERNAL MEDICINE

## 2021-06-29 PROCEDURE — 93010 ELECTROCARDIOGRAM REPORT: CPT | Performed by: INTERNAL MEDICINE

## 2021-06-29 PROCEDURE — 43239 EGD BIOPSY SINGLE/MULTIPLE: CPT | Performed by: INTERNAL MEDICINE

## 2021-06-29 RX ORDER — PANTOPRAZOLE SODIUM 40 MG/1
40 INJECTION, POWDER, FOR SOLUTION INTRAVENOUS
Status: DISCONTINUED | OUTPATIENT
Start: 2021-06-30 | End: 2021-06-30

## 2021-06-29 RX ORDER — PROPOFOL 10 MG/ML
INJECTION, EMULSION INTRAVENOUS AS NEEDED
Status: DISCONTINUED | OUTPATIENT
Start: 2021-06-29 | End: 2021-06-29

## 2021-06-29 RX ORDER — SODIUM CHLORIDE 9 MG/ML
INJECTION, SOLUTION INTRAVENOUS CONTINUOUS PRN
Status: DISCONTINUED | OUTPATIENT
Start: 2021-06-29 | End: 2021-06-29

## 2021-06-29 RX ORDER — SUCRALFATE 1 G/1
1 TABLET ORAL
Status: DISCONTINUED | OUTPATIENT
Start: 2021-06-29 | End: 2021-06-30 | Stop reason: HOSPADM

## 2021-06-29 RX ADMIN — PANTOPRAZOLE SODIUM 40 MG: 40 TABLET, DELAYED RELEASE ORAL at 06:00

## 2021-06-29 RX ADMIN — OXYCODONE HYDROCHLORIDE 2.5 MG: 5 TABLET ORAL at 21:06

## 2021-06-29 RX ADMIN — PROPOFOL 100 MG: 10 INJECTION, EMULSION INTRAVENOUS at 15:54

## 2021-06-29 RX ADMIN — SODIUM CHLORIDE: 0.9 INJECTION, SOLUTION INTRAVENOUS at 15:49

## 2021-06-29 RX ADMIN — DICYCLOMINE HYDROCHLORIDE 20 MG: 20 TABLET ORAL at 16:47

## 2021-06-29 RX ADMIN — FLUOXETINE 60 MG: 20 CAPSULE ORAL at 21:05

## 2021-06-29 RX ADMIN — SUCRALFATE 1 G: 1 TABLET ORAL at 21:05

## 2021-06-29 RX ADMIN — DICYCLOMINE HYDROCHLORIDE 20 MG: 20 TABLET ORAL at 10:26

## 2021-06-29 RX ADMIN — B-COMPLEX W/ C & FOLIC ACID TAB 1 TABLET: TAB at 08:13

## 2021-06-29 RX ADMIN — OXYCODONE HYDROCHLORIDE 5 MG: 5 TABLET ORAL at 10:26

## 2021-06-29 RX ADMIN — ONDANSETRON 4 MG: 2 INJECTION INTRAMUSCULAR; INTRAVENOUS at 21:06

## 2021-06-29 RX ADMIN — SUCRALFATE 1 G: 1 TABLET ORAL at 16:47

## 2021-06-29 RX ADMIN — Medication 1 TABLET: at 16:47

## 2021-06-29 RX ADMIN — Medication 1 TABLET: at 08:13

## 2021-06-29 RX ADMIN — ATORVASTATIN CALCIUM 20 MG: 20 TABLET, FILM COATED ORAL at 21:05

## 2021-06-29 RX ADMIN — PROPOFOL 50 MG: 10 INJECTION, EMULSION INTRAVENOUS at 15:57

## 2021-06-29 RX ADMIN — DICYCLOMINE HYDROCHLORIDE 20 MG: 20 TABLET ORAL at 06:00

## 2021-06-29 RX ADMIN — DICYCLOMINE HYDROCHLORIDE 20 MG: 20 TABLET ORAL at 21:06

## 2021-06-29 NOTE — ASSESSMENT & PLAN NOTE
· Presents to ED with bilateral lower quadrant abdominal discomfort with radiation to back  Reports diarrhea prior to arrival   Afebrile without leukocytosis  Hx of hernia repair and prior gastric sleeve  - likely secondary to viral intracardiac disease, rule out anastomotic ulcer, IBS  · CTA dissection protocol: -mild colitis with panic colitis  · Lipase elevated on admission, trending down- likely non pancreatic source  · Abx: Zosyn in ED  Will monitor off further abx    · Check cdiff  · Mild elevation in CRP  · Stool enteric panel ordered, will follow-up- patient did not have any bowel movement after admission  · Plan for EGD today by GI  · Will follow-up with right upper quadrant ultrasound ordered by GI  · Will continue IV Protonix, Bentyl  · Will advance diet as tolerated

## 2021-06-29 NOTE — PROGRESS NOTES
[facility]  Progress Note Flako Laraeun 1960, 61 y o  male MRN: [id number]  Unit/Bed#: S -01 Encounter: [id number]  Primary Care Provider: Jim Nissen, DO   Date and time admitted to hospital: 6/27/2021  6:04 PM    Abdominal pain  Assessment & Plan  · Presents to ED with bilateral lower quadrant abdominal discomfort with radiation to back  Reports diarrhea prior to arrival   Afebrile without leukocytosis  Hx of hernia repair and prior gastric sleeve  - likely secondary to viral intracardiac disease, rule out anastomotic ulcer, IBS  · CTA dissection protocol: -mild colitis with panic colitis  · Lipase elevated on admission, trending down- likely non pancreatic source  · Abx: Zosyn in ED  Will monitor off further abx  · Check cdiff  · Mild elevation in CRP  · Stool enteric panel ordered, will follow-up- patient did not have any bowel movement after admission  · Plan for EGD today by GI  · Will follow-up with right upper quadrant ultrasound ordered by GI  · Will continue IV Protonix, Bentyl  · Will advance diet as tolerated    Type 2 diabetes mellitus, without long-term current use of insulin ([facility])  Assessment & Plan  Lab Results   Component Value Date    HGBA1C 5 5 06/25/2020     · No longer on metformin  Blood sugars now control status post gastric bypass  · Will recheck A1c and monitor glucose with BMP    * Near syncope  Assessment & Plan  · Presents to ED with near syncope  Complained of cold sweats, sudden onset of abdominal pain with radiation to back  Also reported diarrhea prior to arrival   Suspect near syncopal event secondary to acute pain- vasovagal stimulation  · Initial EKG NSR, HR 65  · Orthostatics negative   · Troponin x3 negative  · Plan as above for abdominal pain      VTE Pharmacologic Prophylaxis:   VTE Score: 2 Low Risk (Score 0-2) - Encourage Ambulation      Mechanical VTE Prophylaxis in Place: No    Patient Centered Rounds: I have performed bedside rounds with nursing staff today  Discussions with Specialists or Other Care Team Provider:  Met Case Management, nursing    Education and Discussions with Family / Patient: Will call patient's family after EGD    Current Length of Stay: 0 day(s)    Current Patient Status: Observation     Discharge Plan / Estimated Discharge Date: Pending EGD    Code Status: Level 1 - Full Code      Subjective:   Patient stated his abdominal pain has improved, generalized, 5/10, dull  No nausea or vomiting or diarrhea  Did not have any bowel movement after admission  No lightheadedness or dizziness or near syncopal episodes  Objective:     Vitals:   Temp (24hrs), Av °F (36 7 °C), Min:97 7 °F (36 5 °C), Max:98 3 °F (36 8 °C)    Temp:  [97 7 °F (36 5 °C)-98 3 °F (36 8 °C)] 98 °F (36 7 °C)  HR:  [61-73] 61  Resp:  [16-18] 16  BP: (101-112)/(54-63) 102/54  SpO2:  [93 %-98 %] 98 %  Body mass index is 34 92 kg/m²  Input and Output Summary (last 24 hours): Intake/Output Summary (Last 24 hours) at 2021 1241  Last data filed at 2021 1259  Gross per 24 hour   Intake --   Output 325 ml   Net -325 ml       Physical Exam:     Physical Exam  Constitutional:       Appearance: He is obese  HENT:      Head: Normocephalic and atraumatic  Mouth/Throat:      Mouth: Mucous membranes are moist       Pharynx: Oropharynx is clear  Eyes:      General: No scleral icterus  Right eye: No discharge  Left eye: No discharge  Extraocular Movements: Extraocular movements intact  Conjunctiva/sclera: Conjunctivae normal    Cardiovascular:      Rate and Rhythm: Normal rate and regular rhythm  Pulses: Normal pulses  Heart sounds: Normal heart sounds  Pulmonary:      Effort: Pulmonary effort is normal  No respiratory distress  Abdominal:      General: Bowel sounds are normal  There is no distension  Palpations: Abdomen is soft  Tenderness:  There is abdominal tenderness (Mild, generalized)  There is no right CVA tenderness, left CVA tenderness, guarding or rebound  Hernia: No hernia is present  Skin:     General: Skin is warm and dry  Capillary Refill: Capillary refill takes less than 2 seconds  Neurological:      General: No focal deficit present  Mental Status: He is alert and oriented to person, place, and time  Additional Data:     Labs:  Results from last 7 days   Lab Units 06/29/21  0548   WBC Thousand/uL 9 32   HEMOGLOBIN g/dL 12 3   HEMATOCRIT % 37 8   PLATELETS Thousands/uL 178   NEUTROS PCT % 66   LYMPHS PCT % 24   MONOS PCT % 7   EOS PCT % 3     Results from last 7 days   Lab Units 06/29/21  0548   SODIUM mmol/L 142   POTASSIUM mmol/L 4 0   CHLORIDE mmol/L 106   CO2 mmol/L 31   BUN mg/dL 11   CREATININE mg/dL 1 06   ANION GAP mmol/L 5   CALCIUM mg/dL 8 0*   ALBUMIN g/dL 2 8*   TOTAL BILIRUBIN mg/dL 0 74   ALK PHOS U/L 71   ALT U/L 24   AST U/L 13   GLUCOSE RANDOM mg/dL 93                 Results from last 7 days   Lab Units 06/27/21  2134 06/27/21  1848   LACTIC ACID mmol/L 0 9 2 3*       Imaging: No pertinent imaging reviewed  Recent Cultures (last 7 days):     Results from last 7 days   Lab Units 06/27/21  1848   BLOOD CULTURE  No Growth at 24 hrs  No Growth at 24 hrs         Lines/Drains:  Invasive Devices     Peripheral Intravenous Line            Peripheral IV 06/27/21 Left Forearm 1 day                Telemetry:        Last 24 Hours Medication List:   Current Facility-Administered Medications   Medication Dose Route Frequency Provider Last Rate    aluminum-magnesium hydroxide-simethicone  30 mL Oral Q6H PRN ANIA Alicia      atorvastatin  20 mg Oral HS ANIA Alicia      calcium carbonate-vitamin D  1 tablet Oral BID With Meals ANIA Alicia      dicyclomine  20 mg Oral 4x Daily (AC & HS) Jose Elias Oro MD      FLUoxetine  60 mg Oral HS ANIA Alicia      HYDROmorphone  0 2 mg Intravenous Q4H PRN ANIA Alicia  multivitamin stress formula  1 tablet Oral Daily Lucia El, CRNP      ondansetron  4 mg Intravenous Q6H PRN Lucia El, CRNP      oxyCODONE  2 5 mg Oral Q4H PRN Lucia El, CRNP      Or    oxyCODONE  5 mg Oral Q4H PRN Lucia El, CRNP      [START ON 6/30/2021] pantoprazole  40 mg Intravenous Q24H [facility] Valentino Edgar MD          Today, Patient Was Seen By: Crystal Ricketts MD    ** Please Note: This note has been constructed using a voice recognition system   **

## 2021-06-29 NOTE — ANESTHESIA PREPROCEDURE EVALUATION
Procedure:  EGD    Relevant Problems   ENDO   (+) Type 2 diabetes mellitus, without long-term current use of insulin (HCC)      MUSCULOSKELETAL   (+) Blepharospasm of both eyes      NEURO/PSYCH   (+) OCD (obsessive compulsive disorder)      Results for Leena Hyde (MRN [id number]) as of 6/29/2021 15:12   Ref  Range 6/29/2021 05:48   WBC Latest Ref Range: 4 31 - 10 16 Thousand/uL 9 32   Red Blood Cell Count Latest Ref Range: 3 88 - 5 62 Million/uL 4 05   Hemoglobin Latest Ref Range: 12 0 - 17 0 g/dL 12 3   HCT Latest Ref Range: 36 5 - 49 3 % 37 8   MCV Latest Ref Range: 82 - 98 fL 93   MCH Latest Ref Range: 26 8 - 34 3 pg 30 4   MCHC Latest Ref Range: 31 4 - 37 4 g/dL 32 5   RDW Latest Ref Range: 11 6 - 15 1 % 12 7   Platelet Count Latest Ref Range: 149 - 390 Thousands/uL 178   MPV Latest Ref Range: 8 9 - 12 7 fL 9 6   nRBC Latest Units: /100 WBCs 0          Anesthesia Plan  ASA Score- 3     Anesthesia Type- IV sedation with anesthesia with ASA Monitors  Additional Monitors:   Airway Plan:     Comment: Plan for IV sedation with GETA as back up          Plan Factors-    Chart reviewed  EKG reviewed  Imaging results reviewed  Existing labs reviewed  Patient summary reviewed  Induction- intravenous  Postoperative Plan- Plan for postoperative opioid use  Planned trial extubation    Informed Consent- Anesthetic plan and risks discussed with patient  I personally reviewed this patient with the CRNA  Discussed and agreed on the Anesthesia Plan with the CRNA  Sherre Soulier

## 2021-06-29 NOTE — ANESTHESIA POSTPROCEDURE EVALUATION
Post-Op Assessment Note    CV Status:  Stable  Pain Score: 0    Pain management: satisfactory to patient     Mental Status:  Arousable and sleepy   Hydration Status:  Stable   PONV Controlled:  None   Airway Patency:  Patent      Post Op Vitals Reviewed: Yes      Staff: Anesthesiologist, CRNA         No complications documented      BP   95/57   Temp     Pulse  58   Resp   20   SpO2   98

## 2021-06-29 NOTE — UTILIZATION REVIEW
Continued Stay Review  Admission: Date/Time/Statement:  6/27/2021 2145 observ  Date: 6/29/2021                          Current Patient Class: observation   Current Level of Care: med surg     HPI:60 y o  male initially admitted on 6/27/2021 from home to ED via ems to observation due to near syncope/abdominal pain  Presented after passing out then had sudden onset of abdominal pain with radiation to back just prior to arrival  On exam abdominal tenderness  Lactic acid 2 3  Glucose 187  Lipase 595  Ct abdomen showed possible colitis vs under distention  In the ED given Zofran, 2 does of IV Dilaudid, 1 liter of IVF, Zosyn and Protonix   Plan is IVF, check orthostatics, trend troponin, serial ECG, telemetry  Monitor off antibiotics  Check C diff, sips of clears and consult GI    6/28/2021 observation continues:  Patient has continued pain and rates 6/10  Orthostatics negative  To dc  IVF, await stool studies  Check US RUQ  6/28/2021 per GI - Patient with differential of possible viral enterocolitis vs anastomotic ulcer  For EGD tomorrow  IV Protonix    Assessment/Plan: 6/29/201:  Remains in observation:  Abdominal pain is improved but not gone    Exam non focal   For EGD, continue IV Protonix, Bentyl    Vital Signs:   06/29/21 0700  98 °F (36 7 °C)  61  16  102/54  --  98 %  None (Room air)  Lying   06/28/21 2201  98 3 °F (36 8 °C)  73  16  101/63  --  93 %  None (Room air)  Lying   06/28/21 1523  97 7 °F (36 5 °C)  68  18  112/56  78  95 %  None (Room air)  Lying   06/28/21 0705  --  --  --  121/73  --  --  --  Standing for 3 minutes - Orthostatic VS   06/28/21 0702  --  --  --  120/70  --  --  --  Standing - Orthostatic VS   06/28/21 0701  --  --  --  118/71  --  --  --  Sitting - Orthostatic VS   06/28/21 0659  98 5 °F (36 9 °C)  71  18  120/68  --  94 %  None (Room air)  Lying - Orthostatic VS   06/27/21 2335  98 4 °F (36 9 °C)  61  18  126/68  --  96 %  None (Room air)  Lying   06/27/21 2313  98 5 °F (36 9 °C)  67  18  122/68  --  94 %  None (Room air)  Lying   06/27/21 2030  --  74  14  129/60  87  94 %  None (Room air)  Lying   06/27/21 1930  --  76  14  136/75  100  98 %  None (Room air)  Lying       Pertinent Labs/Diagnostic Results:   6/27/2021 cta chest/abdomen No evidence of aortic aneurysm or dissection   No intramural hematoma  No infiltrate or pleural effusion  Questionable mild diffuse colonic wall thickening with no significant pericolonic inflammatory stranding which may be secondary to an infectious or inflammatory colitis versus underdistention   The study was limited by the lack of oral contrast      Correlation with patient's symptoms is recommended  No free air or free fluid     Small sliding hiatal hernia    6/27/2021 ECG Normal sinus rhythm   Normal ECG   When compared with ECG of 14-MAR-2012 08:20,   No significant change was found   Results from last 7 days   Lab Units 06/29/21  0548 06/28/21  0412 06/27/21  1848   WBC Thousand/uL 9 32 10 80* 9 70   HEMOGLOBIN g/dL 12 3 12 8 14 4   HEMATOCRIT % 37 8 38 4 42 8   PLATELETS Thousands/uL 178 185 239   NEUTROS ABS Thousands/µL 6 14 7 89* 6 90     Results from last 7 days   Lab Units 06/29/21  0548 06/28/21  0412 06/27/21  1848   SODIUM mmol/L 142 141 139   POTASSIUM mmol/L 4 0 3 9 3 9   CHLORIDE mmol/L 106 107 103   CO2 mmol/L 31 29 29   ANION GAP mmol/L 5 5 7   BUN mg/dL 11 17 21   CREATININE mg/dL 1 06 1 08 1 22   EGFR ml/min/1 73sq m 76 74 64   CALCIUM mg/dL 8 0* 8 0* 9 4   MAGNESIUM mg/dL  --   --  2 1     Results from last 7 days   Lab Units 06/29/21  0548 06/27/21  1848   AST U/L 13 17   ALT U/L 24 33   ALK PHOS U/L 71 90   TOTAL PROTEIN g/dL 5 9* 7 2   ALBUMIN g/dL 2 8* 3 7   TOTAL BILIRUBIN mg/dL 0 74 0 43     Results from last 7 days   Lab Units 06/29/21  0548 06/28/21  0412 06/27/21  1848   GLUCOSE RANDOM mg/dL 93 103 187*     Results from last 7 days   Lab Units 06/28/21  0403 06/28/21  0122 06/27/21  2232 06/27/21  1848 TROPONIN I ng/mL <0 02 <0 02 <0 02 <0 02     Results from last 7 days   Lab Units 06/27/21  2134 06/27/21  1848   LACTIC ACID mmol/L 0 9 2 3*     Results from last 7 days   Lab Units 06/28/21  0412 06/27/21  1848   LIPASE u/L 39* 595*     Results from last 7 days   Lab Units 06/28/21  0412   CRP mg/L 4 6*         Results from last 7 days   Lab Units 06/27/21  2018   CLARITY UA  Clear   COLOR UA  Yellow   SPEC GRAV UA  1 010   PH UA  6 5   GLUCOSE UA mg/dl Negative   KETONES UA mg/dl Negative   BLOOD UA  Small*   PROTEIN UA mg/dl Negative   NITRITE UA  Negative   BILIRUBIN UA  Negative   UROBILINOGEN UA E U /dl 0 2   LEUKOCYTES UA  Negative   WBC UA /hpf 1-2   RBC UA /hpf 2-4   BACTERIA UA /hpf Occasional   EPITHELIAL CELLS WET PREP /hpf None Seen     Results from last 7 days   Lab Units 06/27/21  1848   BLOOD CULTURE  No Growth at 24 hrs  No Growth at 24 hrs     ED Treatment:            Medication Administration from 06/27/2021 1803 to 06/27/2021 2315        Date/Time Order Dose Route Action       06/27/2021 1840 ondansetron (ZOFRAN) injection 4 mg 4 mg Intravenous Given       06/27/2021 1840 HYDROmorphone (DILAUDID) injection 0 5 mg 0 5 mg Intravenous Given       06/27/2021 1849 sodium chloride 0 9 % bolus 1,000 mL 1,000 mL Intravenous New Bag       06/27/2021 2021 HYDROmorphone (DILAUDID) injection 1 mg 1 mg Intravenous Given       06/27/2021 2134 piperacillin-tazobactam (ZOSYN) 3 375 g in sodium chloride 0 9 % 100 mL IVPB 3 375 g Intravenous New Bag       06/27/2021 2237 pantoprazole (PROTONIX) injection 40 mg 40 mg Intravenous Given       06/27/2021 2234 lactated ringers infusion 125 mL/hr Intravenous New Bag      Medical History[]Expand by Default        Past Medical History:   Diagnosis Date    Atypical chest pain      Bladder cancer (HCC)      CN IV nerve palsy, right eye      Diabetes mellitus ([address] )      Hyperlipidemia      IFG (impaired fasting glucose)      OCD (obsessive compulsive disorder)    Snorings           Admitting Diagnosis: Diarrhea [R19 7]  Back pain [M54 9]  Colitis [K52 9]  Syncope [R55]  Intractable abdominal pain [R10 9]     Age/Sex: 61 y o  male      Medications:   Scheduled Medications:  atorvastatin, 20 mg, Oral, HS  calcium carbonate-vitamin D, 1 tablet, Oral, BID With Meals  dicyclomine, 20 mg, Oral, 4x Daily (AC & HS)  FLUoxetine, 60 mg, Oral, HS  multivitamin stress formula, 1 tablet, Oral, Daily  pantoprazole, 40 mg, Oral, Early Morning      Continuous IV Infusions:lactated ringers infusion   Rate: 125 mL/hr Dose: 125 mL/hr  Freq: Continuous Route: IV  Indications of Use: IV Hydration  Last Dose: 125 mL/hr (06/28/21 0535)  Start: 06/27/21 2200 End: 06/28/21 1019     PRN Meds:  aluminum-magnesium hydroxide-simethicone, 30 mL, Oral, Q6H PRN  HYDROmorphone, 0 2 mg, Intravenous, Q4H PRN  ondansetron, 4 mg, Intravenous, Q6H PRN - used x 1 6/27/2021   oxyCODONE, 2 5 mg, Oral, Q4H PRN   Or  oxyCODONE, 5 mg, Oral, Q4H PRN - used x 1 6/27, x 5 6/28, x 1 6/29        Discharge Plan: to be determined  Network Utilization Review Department  ATTENTION: Please call with any questions or concerns to [phone] and carefully listen to the prompts so that you are directed to the right person  All voicemails are confidential   Kaveh Bowden all requests for admission clinical reviews, approved or denied determinations and any other requests to dedicated fax number below belonging to the campus where the patient is receiving treatment   List of dedicated fax numbers for the Facilities:  [address] DENIALS (Administrative/Medical Necessity) [phone]   [address] (Maternity/NICU/Pediatrics) [phone]   [address] Rajesh Corea 1277 [phone] [address] [place] [address] Mary Jane Robles 1481 [address] [phone]

## 2021-06-29 NOTE — PLAN OF CARE
Problem: PAIN - ADULT  Goal: Verbalizes/displays adequate comfort level or baseline comfort level  Description: Interventions:  - Encourage patient to monitor pain and request assistance  - Assess pain using appropriate pain scale  - Administer analgesics based on type and severity of pain and evaluate response  - Implement non-pharmacological measures as appropriate and evaluate response  - Consider cultural and social influences on pain and pain management  - Notify physician/advanced practitioner if interventions unsuccessful or patient reports new pain  Outcome: Progressing     Problem: INFECTION - ADULT  Goal: Absence or prevention of progression during hospitalization  Description: INTERVENTIONS:  - Assess and monitor for signs and symptoms of infection  - Monitor lab/diagnostic results  - Monitor all insertion sites, i e  indwelling lines, tubes, and drains  - Monitor endotracheal if appropriate and nasal secretions for changes in amount and color  - [place] appropriate cooling/warming therapies per order  - Administer medications as ordered  - Instruct and encourage patient and family to use good hand hygiene technique  - Identify and instruct in appropriate isolation precautions for identified infection/condition  Outcome: Progressing     Problem: SAFETY ADULT  Goal: Patient will remain free of falls  Description: INTERVENTIONS:  - Educate patient/family on patient safety including physical limitations  - Instruct patient to call for assistance with activity   - Consult OT/PT to assist with strengthening/mobility   - Keep Call bell within reach  - Keep bed low and locked with side rails adjusted as appropriate  - Keep care items and personal belongings within reach  - Initiate and maintain comfort rounds  - Make Fall Risk Sign visible to staff  - Offer Toileting every 2 Hours, in advance of need  - Initiate/Maintain bed alarm  - Obtain necessary fall risk management equipment: alarms  - Apply yellow socks and bracelet for high fall risk patients  - Consider moving patient to room near nurses station  Outcome: Progressing  Goal: Maintain or return to baseline ADL function  Description: INTERVENTIONS:  -  Assess patient's ability to carry out ADLs; assess patient's baseline for ADL function and identify physical deficits which impact ability to perform ADLs (bathing, care of mouth/teeth, toileting, grooming, dressing, etc )  - Assess/evaluate cause of self-care deficits   - Assess range of motion  - Assess patient's mobility; develop plan if impaired  - Assess patient's need for assistive devices and provide as appropriate  - Encourage maximum independence but intervene and supervise when necessary  - Involve family in performance of ADLs  - Assess for home care needs following discharge   - Consider OT consult to assist with ADL evaluation and planning for discharge  - Provide patient education as appropriate  Outcome: Progressing  Goal: Maintains/Returns to pre admission functional level  Description: INTERVENTIONS:  - Perform BMAT or MOVE assessment daily    - Set and communicate daily mobility goal to care team and patient/family/caregiver  - Collaborate with rehabilitation services on mobility goals if consulted  - Perform Range of Motion 4 times a day  - Reposition patient every 4 hours    - Dangle patient 4 times a day  - Stand patient 4 times a day  - Ambulate patient 4 times a day  - Out of bed to chair 3 times a day   - Out of bed for meals 3 times a day  - Out of bed for toileting  - Record patient progress and toleration of activity level   Outcome: Progressing     Problem: DISCHARGE PLANNING  Goal: Discharge to home or other facility with appropriate resources  Description: INTERVENTIONS:  - Identify barriers to discharge w/patient and caregiver  - Arrange for needed discharge resources and transportation as appropriate  - Identify discharge learning needs (meds, wound care, etc )  - Arrange for interpretive services to assist at discharge as needed  - Refer to Case Management Department for coordinating discharge planning if the patient needs post-hospital services based on physician/advanced practitioner order or complex needs related to functional status, cognitive ability, or social support system  Outcome: Progressing     Problem: Knowledge Deficit  Goal: Patient/family/caregiver demonstrates understanding of disease process, treatment plan, medications, and discharge instructions  Description: Complete learning assessment and assess knowledge base    Interventions:  - Provide teaching at level of understanding  - Provide teaching via preferred learning methods  Outcome: Progressing     Problem: GASTROINTESTINAL - ADULT  Goal: Minimal or absence of nausea and/or vomiting  Description: INTERVENTIONS:  - Administer IV fluids if ordered to ensure adequate hydration  - Maintain NPO status until nausea and vomiting are resolved  - Nasogastric tube if ordered  - Administer ordered antiemetic medications as needed  - Provide nonpharmacologic comfort measures as appropriate  - Advance diet as tolerated, if ordered  - Consider nutrition services referral to assist patient with adequate nutrition and appropriate food choices  Outcome: Progressing  Goal: Maintains or returns to baseline bowel function  Description: INTERVENTIONS:  - Assess bowel function  - Encourage oral fluids to ensure adequate hydration  - Administer IV fluids if ordered to ensure adequate hydration  - Administer ordered medications as needed  - Encourage mobilization and activity  - Consider nutritional services referral to assist patient with adequate nutrition and appropriate food choices  Outcome: Progressing  Goal: Maintains adequate nutritional intake  Description: INTERVENTIONS:  - Monitor percentage of each meal consumed  - Identify factors contributing to decreased intake, treat as appropriate  - Assist with meals as needed  - Monitor I&O, weight, and lab values if indicated  - Obtain nutrition services referral as needed  Outcome: Progressing  Goal: Establish and maintain optimal ostomy function  Description: INTERVENTIONS:  - Assess bowel function  - Encourage oral fluids to ensure adequate hydration  - Administer IV fluids if ordered to ensure adequate hydration   - Administer ordered medications as needed  - Encourage mobilization and activity  - Nutrition services referral to assist patient with appropriate food choices  - Assess stoma site  - Consider wound care consult   Outcome: Progressing  Goal: Oral mucous membranes remain intact  Description: INTERVENTIONS  - Assess oral mucosa and hygiene practices  - Implement preventative oral hygiene regimen  - Implement oral medicated treatments as ordered  - Initiate Nutrition services referral as needed  Outcome: Progressing     Problem: Potential for Falls  Goal: Patient will remain free of falls  Description: INTERVENTIONS:  - Educate patient/family on patient safety including physical limitations  - Instruct patient to call for assistance with activity   - Consult OT/PT to assist with strengthening/mobility   - Keep Call bell within reach  - Keep bed low and locked with side rails adjusted as appropriate  - Keep care items and personal belongings within reach  - Initiate and maintain comfort rounds  - Make Fall Risk Sign visible to staff  - Offer Toileting every 2 Hours, in advance of need  - Initiate/Maintain bed alarm  - Obtain necessary fall risk management equipment: alarms  - Apply yellow socks and bracelet for high fall risk patients  - Consider moving patient to room near nurses station  Outcome: Progressing

## 2021-06-29 NOTE — PLAN OF CARE
Problem: PAIN - ADULT  Goal: Verbalizes/displays adequate comfort level or baseline comfort level  Description: Interventions:  - Encourage patient to monitor pain and request assistance  - Assess pain using appropriate pain scale  - Administer analgesics based on type and severity of pain and evaluate response  - Implement non-pharmacological measures as appropriate and evaluate response  - Consider cultural and social influences on pain and pain management  - Notify physician/advanced practitioner if interventions unsuccessful or patient reports new pain  Outcome: Progressing     Problem: INFECTION - ADULT  Goal: Absence or prevention of progression during hospitalization  Description: INTERVENTIONS:  - Assess and monitor for signs and symptoms of infection  - Monitor lab/diagnostic results  - Monitor all insertion sites, i e  indwelling lines, tubes, and drains  - Monitor endotracheal if appropriate and nasal secretions for changes in amount and color  - [place] appropriate cooling/warming therapies per order  - Administer medications as ordered  - Instruct and encourage patient and family to use good hand hygiene technique  - Identify and instruct in appropriate isolation precautions for identified infection/condition  Outcome: Progressing     Problem: SAFETY ADULT  Goal: Patient will remain free of falls  Description: INTERVENTIONS:  - Educate patient/family on patient safety including physical limitations  - Instruct patient to call for assistance with activity   - Consult OT/PT to assist with strengthening/mobility   - Keep Call bell within reach  - Keep bed low and locked with side rails adjusted as appropriate  - Keep care items and personal belongings within reach  - Initiate and maintain comfort rounds  - Make Fall Risk Sign visible to staff  - Offer Toileting every   - Obtain necessary fall risk management equipment:    Apply yellow socks and bracelet for high fall risk patients  - Consider moving patient to room near nurses station  Outcome: Progressing  Goal: Maintain or return to baseline ADL function  Description: INTERVENTIONS:  -  Assess patient's ability to carry out ADLs; assess patient's baseline for ADL function and identify physical deficits which impact ability to perform ADLs (bathing, care of mouth/teeth, toileting, grooming, dressing, etc )  - Assess/evaluate cause of self-care deficits   - Assess range of motion  - Assess patient's mobility; develop plan if impaired  - Assess patient's need for assistive devices and provide as appropriate  - Encourage maximum independence but intervene and supervise when necessary  - Involve family in performance of ADLs  - Assess for home care needs following discharge   - Consider OT consult to assist with ADL evaluation and planning for discharge  - Provide patient education as appropriate  Outcome: Progressing  Goal: Maintains/Returns to pre admission functional level  Description: INTERVENTIONS:  - Perform BMAT or MOVE assessment daily    - Set and communicate daily mobility goal to care team and patient/family/caregiver     - Collaborate with rehabilitation services on mobility goals if consulted  - Out of bed for meals  - Out of bed for toileting  - Record patient progress and toleration of activity level   Outcome: Progressing     Problem: DISCHARGE PLANNING  Goal: Discharge to home or other facility with appropriate resources  Description: INTERVENTIONS:  - Identify barriers to discharge w/patient and caregiver  - Arrange for needed discharge resources and transportation as appropriate  - Identify discharge learning needs (meds, wound care, etc )  - Arrange for interpretive services to assist at discharge as needed  - Refer to Case Management Department for coordinating discharge planning if the patient needs post-hospital services based on physician/advanced practitioner order or complex needs related to functional status, cognitive ability, or social support system  Problem: Knowledge Deficit  Goal: Patient/family/caregiver demonstrates understanding of disease process, treatment plan, medications, and discharge instructions  Description: Complete learning assessment and assess knowledge base    Interventions:  - Provide teaching at level of understanding  - Provide teaching via preferred learning methods  Outcome: Progressing     Problem: GASTROINTESTINAL - ADULT  Goal: Minimal or absence of nausea and/or vomiting  Description: INTERVENTIONS:  - Administer IV fluids if ordered to ensure adequate hydration  - Maintain NPO status until nausea and vomiting are resolved  - Nasogastric tube if ordered  - Administer ordered antiemetic medications as needed  - Provide nonpharmacologic comfort measures as appropriate  - Advance diet as tolerated, if ordered  - Consider nutrition services referral to assist patient with adequate nutrition and appropriate food choices  Outcome: Progressing  Goal: Maintains or returns to baseline bowel function  Description: INTERVENTIONS:  - Assess bowel function  - Encourage oral fluids to ensure adequate hydration  - Administer IV fluids if ordered to ensure adequate hydration  - Administer ordered medications as needed  - Encourage mobilization and activity  - Consider nutritional services referral to assist patient with adequate nutrition and appropriate food choices  Outcome: Progressing  Goal: Maintains adequate nutritional intake  Description: INTERVENTIONS:  - Monitor percentage of each meal consumed  - Identify factors contributing to decreased intake, treat as appropriate  - Assist with meals as needed  - Monitor I&O, weight, and lab values if indicated  - Obtain nutrition services referral as needed  Outcome: Progressing  Goal: Establish and maintain optimal ostomy function  Description: INTERVENTIONS:  - Assess bowel function  - Encourage oral fluids to ensure adequate hydration  - Administer IV fluids if ordered to ensure adequate hydration   - Administer ordered medications as needed  - Encourage mobilization and activity  - Nutrition services referral to assist patient with appropriate food choices  - Assess stoma site  - Consider wound care consult   Outcome: Progressing  Goal: Oral mucous membranes remain intact  Description: INTERVENTIONS  - Assess oral mucosa and hygiene practices  - Implement preventative oral hygiene regimen  - Implement oral medicated treatments as ordered  - Initiate Nutrition services referral as needed  Outcome: Progressing     Problem: Potential for Falls  Goal: Patient will remain free of falls  Description: INTERVENTIONS:  - Educate patient/family on patient safety including physical limitations  - Instruct patient to call for assistance with activity   - Consult OT/PT to assist with strengthening/mobility   - Keep Call bell within reach  - Keep bed low and locked with side rails adjusted as appropriate  - Keep care items and personal belongings within reach  - Initiate and maintain comfort rounds  - Make Fall Risk Sign visible to staff  -- Obtain necessary fall risk management equipment:  - Apply yellow socks and bracelet for high fall risk patients  - Consider moving patient to room near nurses station  Outcome: Progressing

## 2021-06-29 NOTE — PHYSICIAN ADVISOR
Current patient class: Observation  The patient is currently on Hospital Day: 3 at [address] [facility]        The patient was admitted to the hospital  on N/A at N/A for the following diagnosis:  Diarrhea [R19 7]  Back pain [M54 9]  Colitis [K52 9]  Syncope [R55]  Intractable abdominal pain [R10 9]     After review of the relevant documentation, labs, vital signs and test results, the patient is most appropriate for OBSERVATION CLASS  Rationale is as follows: The patient has been under observation class  Today he underwent EGD  It showed hiatal hernia and status post gastric sleeve surgery  Intravenous fluids have been discontinued and he has not required oral pain medications since this morning  He has been receiving Bentyl however  Diet has been advanced to regular diet  Vital signs are stable  I recommend maintaining observation class tonight  If the patient is not ready for discharge tomorrow then I would change to inpatient class and document medical necessity for continued hospitalization      The patients vitals on arrival were   ED Triage Vitals   Temperature Pulse Respirations Blood Pressure SpO2   06/27/21 1811 06/27/21 1808 06/27/21 1808 06/27/21 1808 06/27/21 1808   98 1 °F (36 7 °C) 63 18 (!) 171/90 98 %      Temp Source Heart Rate Source Patient Position - Orthostatic VS BP Location FiO2 (%)   06/27/21 1811 06/27/21 1808 06/27/21 1808 06/27/21 1930 --   Oral Monitor Lying Left arm       Pain Score       06/27/21 1840       Worst Possible Pain           Past Medical History:   Diagnosis Date    Atypical chest pain     Bladder cancer ([facility] [address] )     CN IV nerve palsy, right eye     Diabetes mellitus ([facility] [address] )     Hyperlipidemia     IFG (impaired fasting glucose)     OCD (obsessive compulsive disorder)     Snorings      Past Surgical History:   Procedure Laterality Date    CARDIAC CATHETERIZATION  2011    normal coronary arteries    COLONOSCOPY      HAND SURGERY Left     HERNIA REPAIR      IA COLONOSCOPY FLX DX W/COLLJ SPEC WHEN PFRMD N/A 3/25/2017    Procedure: COLONOSCOPY;  Surgeon: Eda Berry MD;  [facility];   Service: Gastroenterology    TONSILLECTOMY      WISDOM TOOTH EXTRACTION             Consults have been placed to:   IP CONSULT TO GASTROENTEROLOGY    Vitals:    06/29/21 1502 06/29/21 1605 06/29/21 1629 06/29/21 1713   BP: 116/59 97/58 118/68 115/60   BP Location:    Right arm   Pulse: 62 59 61 56   Resp: 16 20 20 20   Temp: (!) 97 °F (36 1 °C) (!) 97 2 °F (36 2 °C) (!) 97 3 °F (36 3 °C) 98 2 °F (36 8 °C)   TempSrc: Temporal Temporal Axillary Oral   SpO2: 99% 93% 96%    Height:           Most recent labs:    Recent Labs     06/28/21  0403 06/28/21  0412 06/29/21  0548   WBC  --  10 80* 9 32   HGB  --  12 8 12 3   HCT  --  38 4 37 8   PLT  --  185 178   K  --  3 9 4 0   CALCIUM  --  8 0* 8 0*   BUN  --  17 11   CREATININE  --  1 08 1 06   LIPASE  --  39*  --    TROPONINI <0 02  --   --    AST  --   --  13   ALT  --   --  24   ALKPHOS  --   --  71       Scheduled Meds:  Current Facility-Administered Medications   Medication Dose Route Frequency Provider Last Rate    aluminum-magnesium hydroxide-simethicone  30 mL Oral Q6H PRN Eda Berry MD      atorvastatin  20 mg Oral HS Eda Berry MD      calcium carbonate-vitamin D  1 tablet Oral BID With Meals Eda Berry MD      dicyclomine  20 mg Oral 4x Daily (AC & HS) Eda Berry MD      FLUoxetine  60 mg Oral HS Eda Berry MD      HYDROmorphone  0 2 mg Intravenous Q4H PRN Eda Berry MD      multivitamin stress formula  1 tablet Oral Daily Eda Berry MD      ondansetron  4 mg Intravenous Q6H PRN Eda Berry MD      oxyCODONE  2 5 mg Oral Q4H PRN Eda Berry MD      Or   [facility] oxyCODONE  5 mg Oral Q4H PRN Eda Berry MD     [facility] Tiny Blotter ON 6/30/2021] pantoprazole  40 mg Intravenous Q24H [address] Eda Berry MD      sucralfate  1 g Oral 4x Daily (AC & HS) Eda Berry MD Continuous Infusions:   PRN Meds:   aluminum-magnesium hydroxide-simethicone    HYDROmorphone    ondansetron    oxyCODONE **OR** oxyCODONE

## 2021-06-29 NOTE — ASSESSMENT & PLAN NOTE
· Presents to ED with near syncope  Complained of cold sweats, sudden onset of abdominal pain with radiation to back  Also reported diarrhea prior to arrival   Suspect near syncopal event secondary to acute pain- vasovagal stimulation      · Initial EKG NSR, HR 65  · Orthostatics negative   · Troponin x3 negative  · Plan as above for abdominal pain

## 2021-06-30 VITALS
RESPIRATION RATE: 18 BRPM | HEART RATE: 64 BPM | TEMPERATURE: 97.8 F | DIASTOLIC BLOOD PRESSURE: 62 MMHG | OXYGEN SATURATION: 97 % | SYSTOLIC BLOOD PRESSURE: 108 MMHG | BODY MASS INDEX: 34.92 KG/M2 | HEIGHT: 63 IN

## 2021-06-30 PROBLEM — K22.10 BARRETT'S ESOPHAGEAL ULCERATION: Status: ACTIVE | Noted: 2021-06-30

## 2021-06-30 LAB
ANION GAP SERPL CALCULATED.3IONS-SCNC: 6 MMOL/L (ref 4–13)
BUN SERPL-MCNC: 13 MG/DL (ref 5–25)
CALCIUM SERPL-MCNC: 8.5 MG/DL (ref 8.3–10.1)
CHLORIDE SERPL-SCNC: 105 MMOL/L (ref 100–108)
CO2 SERPL-SCNC: 29 MMOL/L (ref 21–32)
CREAT SERPL-MCNC: 1.06 MG/DL (ref 0.6–1.3)
ERYTHROCYTE [DISTWIDTH] IN BLOOD BY AUTOMATED COUNT: 12.3 % (ref 11.6–15.1)
GFR SERPL CREATININE-BSD FRML MDRD: 76 ML/MIN/1.73SQ M
GLUCOSE P FAST SERPL-MCNC: 97 MG/DL (ref 65–99)
GLUCOSE SERPL-MCNC: 97 MG/DL (ref 65–140)
HCT VFR BLD AUTO: 37.4 % (ref 36.5–49.3)
HGB BLD-MCNC: 12.2 G/DL (ref 12–17)
MCH RBC QN AUTO: 29.6 PG (ref 26.8–34.3)
MCHC RBC AUTO-ENTMCNC: 32.6 G/DL (ref 31.4–37.4)
MCV RBC AUTO: 91 FL (ref 82–98)
PLATELET # BLD AUTO: 190 THOUSANDS/UL (ref 149–390)
PMV BLD AUTO: 9.5 FL (ref 8.9–12.7)
POTASSIUM SERPL-SCNC: 3.7 MMOL/L (ref 3.5–5.3)
RBC # BLD AUTO: 4.12 MILLION/UL (ref 3.88–5.62)
SODIUM SERPL-SCNC: 140 MMOL/L (ref 136–145)
WBC # BLD AUTO: 7.81 THOUSAND/UL (ref 4.31–10.16)

## 2021-06-30 PROCEDURE — 85027 COMPLETE CBC AUTOMATED: CPT | Performed by: INTERNAL MEDICINE

## 2021-06-30 PROCEDURE — 80048 BASIC METABOLIC PNL TOTAL CA: CPT | Performed by: INTERNAL MEDICINE

## 2021-06-30 PROCEDURE — 99217 PR OBSERVATION CARE DISCHARGE MANAGEMENT: CPT | Performed by: INTERNAL MEDICINE

## 2021-06-30 PROCEDURE — C9113 INJ PANTOPRAZOLE SODIUM, VIA: HCPCS | Performed by: INTERNAL MEDICINE

## 2021-06-30 RX ORDER — PANTOPRAZOLE SODIUM 40 MG/1
40 TABLET, DELAYED RELEASE ORAL
Status: DISCONTINUED | OUTPATIENT
Start: 2021-06-30 | End: 2021-06-30 | Stop reason: HOSPADM

## 2021-06-30 RX ORDER — DICYCLOMINE HCL 20 MG
20 TABLET ORAL 3 TIMES DAILY PRN
Qty: 28 TABLET | Refills: 0 | Status: SHIPPED | OUTPATIENT
Start: 2021-06-30 | End: 2021-07-07

## 2021-06-30 RX ORDER — PANTOPRAZOLE SODIUM 40 MG/1
40 TABLET, DELAYED RELEASE ORAL
Qty: 60 TABLET | Refills: 0 | Status: SHIPPED | OUTPATIENT
Start: 2021-06-30 | End: 2021-07-30

## 2021-06-30 RX ORDER — POLYETHYLENE GLYCOL 3350 17 G/17G
17 POWDER, FOR SOLUTION ORAL DAILY
Status: DISCONTINUED | OUTPATIENT
Start: 2021-06-30 | End: 2021-06-30

## 2021-06-30 RX ORDER — POLYETHYLENE GLYCOL 3350 17 G/17G
17 POWDER, FOR SOLUTION ORAL 2 TIMES DAILY
Status: DISCONTINUED | OUTPATIENT
Start: 2021-06-30 | End: 2021-06-30 | Stop reason: HOSPADM

## 2021-06-30 RX ORDER — POLYETHYLENE GLYCOL 3350 17 G/17G
17 POWDER, FOR SOLUTION ORAL 2 TIMES DAILY PRN
Qty: 119 G | Refills: 0 | Status: SHIPPED | OUTPATIENT
Start: 2021-06-30

## 2021-06-30 RX ORDER — SUCRALFATE 1 G/1
1 TABLET ORAL
Qty: 120 TABLET | Refills: 0 | Status: SHIPPED | OUTPATIENT
Start: 2021-06-30 | End: 2021-07-30

## 2021-06-30 RX ADMIN — DICYCLOMINE HYDROCHLORIDE 20 MG: 20 TABLET ORAL at 08:02

## 2021-06-30 RX ADMIN — DICYCLOMINE HYDROCHLORIDE 20 MG: 20 TABLET ORAL at 11:55

## 2021-06-30 RX ADMIN — POLYETHYLENE GLYCOL 3350 17 G: 17 POWDER, FOR SOLUTION ORAL at 08:02

## 2021-06-30 RX ADMIN — SUCRALFATE 1 G: 1 TABLET ORAL at 11:55

## 2021-06-30 RX ADMIN — B-COMPLEX W/ C & FOLIC ACID TAB 1 TABLET: TAB at 08:01

## 2021-06-30 RX ADMIN — Medication 1 TABLET: at 08:02

## 2021-06-30 RX ADMIN — PANTOPRAZOLE SODIUM 40 MG: 40 INJECTION, POWDER, FOR SOLUTION INTRAVENOUS at 08:01

## 2021-06-30 RX ADMIN — SUCRALFATE 1 G: 1 TABLET ORAL at 08:02

## 2021-06-30 NOTE — ASSESSMENT & PLAN NOTE
· CTA dissection protocol: -mild colitis with panic colitis  · Lipase elevated on admission, trending down- likely non pancreatic source  · Likely secondary to abdominal spasm, constipation  · EGD performed - by cm hiatal hernia,?   Vargas's, linear superficial ulcer in esophagus, normal stomach and duodenum  · Right upper quadrant ultrasound-unremarkable  · Protonix b i d   · His MiraLax as needed for constipation  · Bentyl as needed for spasm  · Follow-up with PCP

## 2021-06-30 NOTE — DISCHARGE INSTR - AVS FIRST PAGE
Dear Norberto Trivedi,     It was our pleasure to care for you here at [facility]  It is our hope that we were always able to exceed the expected standards for your care during your stay  You were hospitalized due to abdominal pain and passing out  You were cared for on the 4th floor by Lucille Brock MD under the service of Dawson Burton MD with [facility] who covers for your primary care physician (PCP), Sy Araiza DO, while you were hospitalized  If you have any questions or concerns related to this hospitalization, you may contact us at [phone]  For follow up as well as any medication refills, we recommend that you follow up with your primary care physician  A registered nurse will reach out to you by phone within a few days after your discharge to answer any additional questions that you may have after going home  However, at this time we provide for you here, the most important instructions / recommendations at discharge:     · Notable Medication Adjustments -   · Start taking Protonix 2 times daily  · Start taking sucralfate 4 times a day  · Take Bentyl as needed for abdominal pain-not more than 3 times a day  · Take MiraLax for constipation as needed-not more than 2 times a day  · Testing Required after Discharge -   · None  · Important follow up information -   · Please follow-up with your primary care physician within 1 week of discharge  · Other Instructions -   · If you experience intractable abdominal pain, nausea or vomiting, please call your primary care physician  · Please review this entire after visit summary as additional general instructions including medication list, appointments, activity, diet, any pertinent wound care, and other additional recommendations from your care team that may be provided for you        Sincerely,     Lucille Brock MD

## 2021-06-30 NOTE — ASSESSMENT & PLAN NOTE
Lab Results   Component Value Date    HGBA1C 5 5 06/25/2020     · No longer on metformin    Blood sugars now control status post gastric bypass

## 2021-06-30 NOTE — PLAN OF CARE
Problem: PAIN - ADULT  Goal: Verbalizes/displays adequate comfort level or baseline comfort level  Description: Interventions:  - Encourage patient to monitor pain and request assistance  - Assess pain using appropriate pain scale  - Administer analgesics based on type and severity of pain and evaluate response  - Implement non-pharmacological measures as appropriate and evaluate response  - Consider cultural and social influences on pain and pain management  - Notify physician/advanced practitioner if interventions unsuccessful or patient reports new pain  Outcome: Progressing     Problem: INFECTION - ADULT  Goal: Absence or prevention of progression during hospitalization  Description: INTERVENTIONS:  - Assess and monitor for signs and symptoms of infection  - Monitor lab/diagnostic results  - Monitor all insertion sites, i e  indwelling lines, tubes, and drains  - Monitor endotracheal if appropriate and nasal secretions for changes in amount and color  - [place] appropriate cooling/warming therapies per order  - Administer medications as ordered  - Instruct and encourage patient and family to use good hand hygiene technique  - Identify and instruct in appropriate isolation precautions for identified infection/condition  Outcome: Progressing     Problem: DISCHARGE PLANNING  Goal: Discharge to home or other facility with appropriate resources  Description: INTERVENTIONS:  - Identify barriers to discharge w/patient and caregiver  - Arrange for needed discharge resources and transportation as appropriate  - Identify discharge learning needs (meds, wound care, etc )  - Arrange for interpretive services to assist at discharge as needed  - Refer to Case Management Department for coordinating discharge planning if the patient needs post-hospital services based on physician/advanced practitioner order or complex needs related to functional status, cognitive ability, or social support system  Outcome: Progressing Problem: Knowledge Deficit  Goal: Patient/family/caregiver demonstrates understanding of disease process, treatment plan, medications, and discharge instructions  Description: Complete learning assessment and assess knowledge base    Interventions:  - Provide teaching at level of understanding  - Provide teaching via preferred learning methods  Outcome: Progressing

## 2021-06-30 NOTE — DISCHARGE SUMMARY
**Please Note: This note may have been constructed using a voice recognition system**  Discharge- Rupesh Garza 1960, 61 y o  male MRN: [id number]  Unit/Bed#: S -01 Encounter: [id number]  Primary Care Provider: Jim Nissen, DO   Date and time admitted to hospital: 6/27/2021  6:04 PM    Abdominal pain  Assessment & Plan    · CTA dissection protocol: -mild colitis with panic colitis  · Lipase elevated on admission, trending down- likely non pancreatic source  · Likely secondary to abdominal spasm, constipation  · EGD performed - by cm hiatal hernia,? Vargas's, linear superficial ulcer in esophagus, normal stomach and duodenum  · Right upper quadrant ultrasound-unremarkable  · Protonix b i d   · His MiraLax as needed for constipation  · Bentyl as needed for spasm  · Follow-up with PCP    Vargas's esophageal ulceration  Assessment & Plan  Protonix b i d  Type 2 diabetes mellitus, without long-term current use of insulin [facility])  Assessment & Plan  Lab Results   Component Value Date    HGBA1C 5 5 06/25/2020     · No longer on metformin  Blood sugars now control status post gastric bypass      * Near syncope  Assessment & Plan  · Likely vasovagal syncope    [address]   Medical Problems     Resolved Problems  Date Reviewed: 6/27/2021        Resolved    Elevated lactic acid level 6/28/2021     Resolved by  Joo Lim MD              Discharging Resident: Joo Lim MD  Discharging Attending: No att  providers found  PCP: Jim Nissen, DO  Admission Date:   Admission Orders (From admission, onward)     Ordered        06/27/21 2145  Place in Observation  Once                   Discharge Date: 06/30/21    Consultations During Hospital Stay:  · GI    Procedures Performed:   EGD-5 cm hiatal hernia - GE junction 31 cm from the incisors, diaphragmatic impression 36 cm from the incisors  1 cm broad columnar mucosal extension from the GE junction was biopsied for Vargas's  Above this area there is a linear superficial ulceration on the fold  The stomach appeared normal  Performed random biopsy  Status post gastric sleeve surgery  The duodenum appeared normal      Significant Findings / Test Results:   EGD    Result Date: 6/29/2021  Impression: As above RECOMMENDATION: Await pathology results Protonix and Carafate Try Bentyl p r n  For abdominal pain  Dave Rivera MD     CTA dissection protocol chest/abdomen/pelvis    Result Date: 6/27/2021  Impression: No evidence of aortic aneurysm or dissection  No intramural hematoma  No infiltrate or pleural effusion  Questionable mild diffuse colonic wall thickening with no significant pericolonic inflammatory stranding which may be secondary to an infectious or inflammatory colitis versus underdistention  The study was limited by the lack of oral contrast   Correlation with patient's symptoms is recommended  No free air or free fluid  Small sliding hiatal hernia  Workstation performed: [id number]     US right upper quadrant    Result Date: 6/30/2021  · Impression: Pancreas is not well visualized due to shadowing from bowel gas  Otherwise unremarkable sonographic evaluation of the right upper quadrant  Workstation performed: [id number]     Incidental Findings:   · None     Test Results Pending at Discharge (will require follow up): · None     Outpatient Tests Requested:  · None    Complications:  None    Reason for Admission:  Abdominal pain, near syncope    Hospital Course:   Shaun Campbell is a 61 y o  male patient with past medical history of gastric sleeve, hernia repair, bladder cancer status post BCG, who originally presented to the hospital on 6/27/2021 due to bilateral lower quadrant abdominal pain and near syncopal event  On admission, CT was done-which showed colitis versus underdistention, fatty infiltration of the pancreas, chronic mesenteric panniculitis  Also had an elevated lipase    Patient had a loose bowel movement along with abdominal pain before presentation  During hospitalization, patient did not have any bowel movements no syncopal events  GI was consulted for abdominal pain at EGD was performed along with right upper quadrant ultrasound please refer to the results above  Patient tolerated advanced diet  Patient is being discharged with bentyl, carafate, PPI, and with recommendations to F/u with PCP  Patient also had Oxycodone during hospitalization for abdominal pain, and did not have any bowel movement after the loose BM before admission, also prescribed Miralax as nneded for Constipation        Please see above list of diagnoses and related plan for additional information  Condition at Discharge: stable    Discharge Day Visit / Exam:   Subjective:  No complaints, except for no bowel movement  Vitals: Blood Pressure: 108/62 (06/30/21 0728)  Pulse: 64 (06/30/21 0728)  Temperature: 97 8 °F (36 6 °C) (06/30/21 0728)  Temp Source: Oral (06/30/21 0728)  Respirations: 18 (06/30/21 0728)  Height: 5' 2 5" (158 8 cm) (06/27/21 2335)  SpO2: 97 % (06/30/21 0728)  Exam:   Physical Exam  Constitutional:       Appearance: He is obese  HENT:      Head: Normocephalic and atraumatic  Mouth/Throat:      Mouth: Mucous membranes are moist       Pharynx: Oropharynx is clear  Eyes:      General: No scleral icterus  Right eye: No discharge  Left eye: No discharge  Extraocular Movements: Extraocular movements intact  Conjunctiva/sclera: Conjunctivae normal    Cardiovascular:      Rate and Rhythm: Normal rate and regular rhythm  Pulses: Normal pulses  Heart sounds: Normal heart sounds  Pulmonary:      Effort: Pulmonary effort is normal  No respiratory distress  Abdominal:      General: Bowel sounds are normal  There is no distension  Palpations: Abdomen is soft  Tenderness: There is no abdominal tenderness   There is no right CVA tenderness, left CVA tenderness, guarding or rebound  Hernia: No hernia is present  Skin:     General: Skin is warm and dry  Capillary Refill: Capillary refill takes less than 2 seconds  Neurological:      General: No focal deficit present  Mental Status: He is alert and oriented to person, place, and time  Discussion with Family: Patient declined the offer to call family  Discharge instructions/Information to patient and family:   See after visit summary for information provided to patient and family  Provisions for Follow-Up Care:  See after visit summary for information related to follow-up care and any pertinent home health orders  Disposition:   Home    Planned Readmission: None    Discharge Medications:  See after visit summary for reconciled discharge medications provided to patient and/or family        **Please Note: This note may have been constructed using a voice recognition system**

## 2021-06-30 NOTE — DISCHARGE INSTRUCTIONS
Abdominal Pain, Ambulatory Care   GENERAL INFORMATION:   Abdominal pain  can be dull, achy, or sharp  You may have pain in one area of your abdomen, or in your entire abdomen  Your pain may be caused by constipation, food sensitivity or poisoning, infection, or a blockage  Abdominal pain can also be caused by a hernia, appendicitis, or an ulcer  The cause of your abdominal pain may be unknown  Seek immediate care for the following symptoms:   · New chest pain or shortness of breath    · Pulsing pain in your upper abdomen or lower back that suddenly becomes constant    · Pain in the right lower abdominal area that worsens with movement    · Fever over 100 4°F (38°C) or shaking chills    · Vomiting and you cannot keep food or fluids down    · Pain does not improve or gets worse over the next 8 to 12 hours    · Blood in your vomit or bowel movements, or they look black and tarry    Skin or the whites of your eyes turn yellow    Treatment for abdominal pain  may include medicine to calm your stomach, prevent vomiting, or decrease pain  Constipation   AMBULATORY CARE:   Constipation  is when you have hard, dry bowel movements, or you go longer than usual between bowel movements  Constipation may be caused by a lack of water or high-fiber foods  Certain medicines, or a lack of fiber or physical activity may also cause constipation  Common symptoms include the following:   Trouble pushing out your bowel movement    Pain or bleeding during your bowel movement    A feeling that you did not finish having your bowel movement    Nausea    Bloating    Headache    Call your doctor if:   You have blood in your bowel movements  You have a fever and abdominal pain with the constipation  Your constipation gets worse  You start to vomit  You have questions or concerns about your condition or care  Relieve constipation:  Medicine can keep you have a bowel movement more easily   Medicines may increase moisture in your bowel movement or increase the motion of your intestines  A suppository  may be used to help soften your bowel movements  This may make them easier to pass  A suppository is guided into your rectum through your anus  Laxatives  can help stimulate your bowels to have a bowel movement  Your provider may recommend you only use laxatives for a short time  Long-term use may make your bowels dependent on the medicine  An enema  is liquid medicine used to clear bowel movement from your rectum  The medicine is put into your rectum through your anus  Prevent constipation:   Drink liquids as directed  You may need to drink extra liquids to help soften and move your bowels  Ask how much liquid to drink each day and which liquids are best for you  Eat high-fiber foods  This may help decrease constipation by adding bulk to your bowel movements  High-fiber foods include fruit, vegetables, whole-grain breads and cereals, and beans  Your healthcare provider or dietitian can help you create a high-fiber meal plan  Your provider may also recommend a fiber supplement if you cannot get enough fiber from food  Exercise regularly  Regular physical activity can help stimulate your intestines  Walking is a good exercise to prevent or relieve constipation  Ask which exercises are best for you  Schedule a time each day to have a bowel movement  This may help train your body to have regular bowel movements  Bend forward while you are on the toilet to help move the bowel movement out  Sit on the toilet for at least 10 minutes, even if you do not have a bowel movement  Talk to your healthcare provider about your medicines  Certain medicines, such as opioids, can cause constipation  Your provider may be able to make medicine changes  For example, he or she may change the kind of medicine, or change when you take it

## 2021-06-30 NOTE — UTILIZATION REVIEW
Continued Stay Review  Admission: Date/Time/Statement:  6/27/2021 2145 observation     Date: 6/30/2021                           Current Patient Class: observation  Current Level of Care: med surg    HPI:60 y o  male initially admitted on 6/27/2021 from home to ED via ems to observation due to near syncope/abdominal pain  Presented after passing out then had sudden onset of abdominal pain with radiation to back just prior to arrival  On exam abdominal tenderness  Lactic acid 2 3  Glucose 187  Lipase 595  Ct abdomen showed possible colitis vs under distention  In the ED given Zofran, 2 does of IV Dilaudid, 1 liter of IVF, Zosyn and Protonix   Plan is IVF, check orthostatics, trend troponin, serial ECG, telemetry  Monitor off antibiotics  Check C diff, sips of clears and consult GI     6/28/2021 observation continues:  Patient has continued pain and rates 6/10  Orthostatics negative  To dc  IVF, await stool studies  Check US RUQ  6/28/2021 per GI - Patient with differential of possible viral enterocolitis vs anastomotic ulcer  For EGD tomorrow  IV Protonix      6/29/201:  Remains in observation:  Abdominal pain is improved but not gone  Exam non focal   For EGD, continue IV Protonix, Bentyl  Procedure 6/29/2021: egd - 5 cm hiatal hernia - GE junction 31 cm from the incisors, diaphragmatic impression 36 cm from the incisors  1 cm broad columnar mucosal extension from the GE junction was biopsied for Vargas's  Above this area there is a linear superficial ulceration on the fold  · The stomach appeared normal  Performed random biopsy  Status post gastric sleeve surgery           The duodenum appeared normal     Assessment/Plan: 6/30/2021:  Using po analgesia  Diet advanced       Vital Signs:   06/30/21 0728  97 8 °F (36 6 °C)  64  18  108/62  79  97 %  None (Room air)  Lying   06/29/21 2225  98 1 °F (36 7 °C)  63  17  99/55  72  95 %  None (Room air)       06/29/21 0700   98 °F (36 7 °C)   61   16   102/54   --   98 %   None (Room air)   Lying   06/28/21 2201   98 3 °F (36 8 °C)   73   16   101/63   --   93 %   None (Room air)   Lying   06/28/21 1523   97 7 °F (36 5 °C)   68   18   112/56   78   95 %   None (Room air)   Lying   06/28/21 0705   --   --   --   121/73   --   --   --   Standing for 3 minutes - Orthostatic VS   06/28/21 0702   --   --   --   120/70   --   --   --   Standing - Orthostatic VS   06/28/21 0701   --   --   --   118/71   --   --   --   Sitting - Orthostatic VS   06/28/21 0659   98 5 °F (36 9 °C)   71   18   120/68   --   94 %   None (Room air)   Lying - Orthostatic VS   06/27/21 2335   98 4 °F (36 9 °C)   61   18   126/68   --   96 %   None (Room air)   Lying   06/27/21 2313   98 5 °F (36 9 °C)   67   18   122/68   --   94 %   None (Room air)   Lying   06/27/21 2030   --   74   14   129/60   87   94 %   None (Room air)   Lying   06/27/21 1930   --   76   14   136/75   100   98 %   None (Room air)   Lying       Pertinent Labs/Diagnostic Results:   6/27/2021 cta chest/abdomen No evidence of aortic aneurysm or dissection   No intramural hematoma  No infiltrate or pleural effusion  Questionable mild diffuse colonic wall thickening with no significant pericolonic inflammatory stranding which may be secondary to an infectious or inflammatory colitis versus underdistention   The study was limited by the lack of oral contrast      Correlation with patient's symptoms is recommended  No free air or free fluid     Small sliding hiatal hernia     6/27/2021 ECG Normal sinus rhythm   Normal ECG   When compared with ECG of 14-MAR-2012 08:20,   No significant change was found     Results from last 7 days   Lab Units 06/30/21  0509 06/29/21  0548 06/28/21  0412 06/27/21  1848   WBC Thousand/uL 7 81 9 32 10 80* 9 70   HEMOGLOBIN g/dL 12 2 12 3 12 8 14 4   HEMATOCRIT % 37 4 37 8 38 4 42 8   PLATELETS Thousands/uL 190 178 185 239   NEUTROS ABS Thousands/µL  --  6 14 7 89* 6 90     Results from last 7 days   Lab Units 06/30/21  0509 06/29/21  0548 06/28/21  0412 06/27/21  1848   SODIUM mmol/L 140 142 141 139   POTASSIUM mmol/L 3 7 4 0 3 9 3 9   CHLORIDE mmol/L 105 106 107 103   CO2 mmol/L 29 31 29 29   ANION GAP mmol/L 6 5 5 7   BUN mg/dL 13 11 17 21   CREATININE mg/dL 1 06 1 06 1 08 1 22   EGFR ml/min/1 73sq m 76 76 74 64   CALCIUM mg/dL 8 5 8 0* 8 0* 9 4   MAGNESIUM mg/dL  --   --   --  2 1     Results from last 7 days   Lab Units 06/29/21  0548 06/27/21  1848   AST U/L 13 17   ALT U/L 24 33   ALK PHOS U/L 71 90   TOTAL PROTEIN g/dL 5 9* 7 2   ALBUMIN g/dL 2 8* 3 7   TOTAL BILIRUBIN mg/dL 0 74 0 43     Results from last 7 days   Lab Units 06/30/21  0509 06/29/21  0548 06/28/21  0412 06/27/21  1848   GLUCOSE RANDOM mg/dL 97 93 103 187*     Results from last 7 days   Lab Units 06/28/21  0403 06/28/21  0122 06/27/21  2232 06/27/21  1848   TROPONIN I ng/mL <0 02 <0 02 <0 02 <0 02     Results from last 7 days   Lab Units 06/27/21  2134 06/27/21  1848   LACTIC ACID mmol/L 0 9 2 3*     Results from last 7 days   Lab Units 06/28/21  0412 06/27/21  1848   LIPASE u/L 39* 595*     Results from last 7 days   Lab Units 06/28/21  0412   CRP mg/L 4 6*     Results from last 7 days   Lab Units 06/27/21  2018   CLARITY UA  Clear   COLOR UA  Yellow   SPEC GRAV UA  1 010   PH UA  6 5   GLUCOSE UA mg/dl Negative   KETONES UA mg/dl Negative   BLOOD UA  Small*   PROTEIN UA mg/dl Negative   NITRITE UA  Negative   BILIRUBIN UA  Negative   UROBILINOGEN UA E U /dl 0 2   LEUKOCYTES UA  Negative   WBC UA /hpf 1-2   RBC UA /hpf 2-4   BACTERIA UA /hpf Occasional   EPITHELIAL CELLS WET PREP /hpf None Seen     Results from last 7 days   Lab Units 06/27/21  1848   BLOOD CULTURE  No Growth at 48 hrs  No Growth at 48 hrs       ED Treatment:                                  Medication Administration from 06/27/2021 1803 to 06/27/2021 2287        Date/Time Order Dose Route Action       06/27/2021 1840 ondansetron (ZOFRAN) injection 4 mg 4 mg Intravenous Given       06/27/2021 1840 HYDROmorphone (DILAUDID) injection 0 5 mg 0 5 mg Intravenous Given       06/27/2021 1849 sodium chloride 0 9 % bolus 1,000 mL 1,000 mL Intravenous New Bag       06/27/2021 2021 HYDROmorphone (DILAUDID) injection 1 mg 1 mg Intravenous Given       06/27/2021 2134 piperacillin-tazobactam (ZOSYN) 3 375 g in sodium chloride 0 9 % 100 mL IVPB 3 375 g Intravenous New Bag       06/27/2021 2237 pantoprazole (PROTONIX) injection 40 mg 40 mg Intravenous Given       06/27/2021 2234 lactated ringers infusion 125 mL/hr Intravenous New Bag      Medical History[]? Expand by Default           Past Medical History:   Diagnosis Date    Atypical chest pain      Bladder cancer (HCC)      CN IV nerve palsy, right eye      Diabetes mellitus ([facility] [address] )      Hyperlipidemia      IFG (impaired fasting glucose)      OCD (obsessive compulsive disorder)      Snorings           Admitting Diagnosis: Diarrhea [R19 7]  Back pain [M54 9]  Colitis [K52 9]  Syncope [R55]  Intractable abdominal pain [R10 9]     Age/Sex: 61 y  o  male    Medications:   Scheduled Medications:  atorvastatin, 20 mg, Oral, HS  calcium carbonate-vitamin D, 1 tablet, Oral, BID With Meals  dicyclomine, 20 mg, Oral, 4x Daily (AC & HS)  FLUoxetine, 60 mg, Oral, HS  multivitamin stress formula, 1 tablet, Oral, Daily  pantoprazole, 40 mg, Intravenous, Q24H VIKI  polyethylene glycol, 17 g, Oral, Daily  sucralfate, 1 g, Oral, 4x Daily (AC & HS)    Continuous IV Infusions:  lactated ringers infusion   Rate: 125 mL/hr Dose: 125 mL/h  Freq: Continuous Route: IV  Indications of Use: IV Hydration  Last Dose: 125 mL/hr (06/28/21 0535)  Start: 06/27/21 2200 End: 06/28/21 1019       PRN Meds:  aluminum-magnesium hydroxide-simethicone, 30 mL, Oral, Q6H PRN  HYDROmorphone, 0 2 mg, Intravenous, Q4H PRN  ondansetron, 4 mg, Intravenous, Q6H PRN - used x 1 6/27,  X 1 6/29/2021   oxyCODONE, 2 5 mg, Oral, Q4H PRN - used x 1 6/29/2021    Or  oxyCODONE, 5 mg, Oral, Q4H PRN - used x 1 6/27, x 5 6/28, x 1 6/29/2021         Discharge Plan: to be determined  Network Utilization Review Department  ATTENTION: Please call with any questions or concerns to [phone] and carefully listen to the prompts so that you are directed to the right person  All voicemails are confidential   Lala Goldstein all requests for admission clinical reviews, approved or denied determinations and any other requests to dedicated fax number below belonging to the campus where the patient is receiving treatment   List of dedicated fax numbers for the Facilities:  [address] DENIALS (Administrative/Medical Necessity) [phone]   [address] (Maternity/NICU/Pediatrics) [phone]   [address] Rajesh Corea [address] [id number] [place] [address] [address] [facility] [place] [address] [phone]

## 2021-07-02 LAB
BACTERIA BLD CULT: NORMAL
BACTERIA BLD CULT: NORMAL

## 2021-07-29 ENCOUNTER — TELEPHONE (OUTPATIENT)
Dept: NEUROLOGY | Facility: CLINIC | Age: 61
End: 2021-07-29

## 2021-07-29 NOTE — TELEPHONE ENCOUNTER
Patient is scheduled 8/25/2021 with Dr Ila Jacobsen in the [facility] location

## 2021-07-29 NOTE — TELEPHONE ENCOUNTER
Type Date User Summary Attachment   General 07/23/2021  3:47 PM Merissa Acosta care coordination -   Note    Botox 100 units - authorization # [id number] - valid 4 visits(400 units total) - 2nd visit - valid dates 4/28/2021 - 4/28/2022   Please use [facility]      Thanks  Kapil Sanchez

## 2021-08-02 NOTE — TELEPHONE ENCOUNTER
[address]  spoke with Pa Mallory, advised I was calling to initiate a refill for patient's botox medication  Refill has been initiated and patient's profile sent to insurance verification   Will call for a status check in 2 days

## 2021-08-04 NOTE — TELEPHONE ENCOUNTER
[address]  spoke with Luz Pederson, advised I was calling to check the status of patient's botox order  Fermin apologized and states that when I called in 2 days ago a new order was not started  Fermin created a new order and patient's profile will now be sent to insurance verifcation   Will call for a status check in 2 days

## 2021-08-06 NOTE — TELEPHONE ENCOUNTER
[address]  spoke with Orin Olson, advised I was calling to check the status of patient's botox order  Orin Olson states that order is still in insurance verification   Will call for a status check in 2 days

## 2021-08-10 NOTE — TELEPHONE ENCOUNTER
[address]  spoke with Carlos Jimenez, advised I was calling to check the status of patient's botox order  Carlos Jimenez states that patient's order has already been scheduled for delivery  Benefits have been verified and delivery was scheduled via [employer]  Botox is scheduled for delivery Wednesday 8/11/2021 to the [place] location via [employer] Priority Overnight    Please await patient's botox delivery and document once received, please advise if medication is not received by 2pm     Thanks  Audra Singh

## 2021-08-11 NOTE — TELEPHONE ENCOUNTER
Botox number of units: 100  Botox quantity: One  Arrived at what location: [facility]  Botox at Correct Administering Location: Yes  NDC number: 0604-4015-83  Lot number: [id number]  Expiration Date: 11/2023  Appt notes indicate correct medication: Yes

## 2021-08-25 ENCOUNTER — PROCEDURE VISIT (OUTPATIENT)
Dept: NEUROLOGY | Facility: CLINIC | Age: 61
End: 2021-08-25
Payer: COMMERCIAL

## 2021-08-25 VITALS — HEART RATE: 64 BPM | SYSTOLIC BLOOD PRESSURE: 116 MMHG | DIASTOLIC BLOOD PRESSURE: 68 MMHG | TEMPERATURE: 98.3 F

## 2021-08-25 DIAGNOSIS — G24.5 BLEPHAROSPASM OF BOTH EYES: Primary | ICD-10-CM

## 2021-08-25 PROCEDURE — 64612 DESTROY NERVE FACE MUSCLE: CPT | Performed by: PSYCHIATRY & NEUROLOGY

## 2021-08-25 NOTE — PROGRESS NOTES
Universal Protocol   Consent: Written consent obtained  Consent given by: patient  Patient identity confirmed: verbally with patient        Chemodenervation     Date/Time 8/25/2021 10:01 AM     Performed by  Andrew Escalante MD     Authorized by Andrew Escalante MD        Pre-procedure details      Prepped With: Alcohol     Anesthesia  (see MAR for exact dosages): Anesthesia method:  None   Procedure details     Position:  Supine   Botox     Brand:  Botox    mL's of preservative free sterile saline:  2    Final Concentration per CC:  50 units    Needle gauge: 30G    Procedures     Botox Procedures: blepharospasm      Indications: blepharospasm      Date of last injection:  5/19/2021   Injection Location      Head / Face:  R , L , R orbicularis oculi, L orbicularis oculi and procerus    L  injection amount:  2 5 unit(s)    R  injection amount:  2 5 unit(s)    Procerus injection amount:  2 5 unit(s)    L orbicularis oculi injection amount:  12 5 unit(s)    R orbicularis oculi injection amount:  12 5 unit(s)    Comments:  2 5 x 5 in each eye (units into medial upper and lower canthus, outer upper and lower canthus, lateral canthus)   Total Units     Total units discarded:  67 5   Post-procedure details      Chemodenervation:  Head or face    Facial Nerve Location[de-identified]  Bilateral facial nerve    Patient tolerance of procedure: Tolerated well, no immediate complications   Comments         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  To review, he gradually developed frequent eye blinking and spasms in April/May 2020 which worsened to the point of being present much of the day  Fluoextine for 18 years for OCD has been controlled  Dx in his 29's  He has "night tremors" since 2019  Left hand tremor on occasion while driving  Family history of "undiagnosed tics" described as some with eye twiching ( cousins and uncle)    He can suppress blinking for short periods of time if he really tries to concentrate on this  There is no history of frequent blinking or motor tics as a child  No sensory tricks  No photophobia  He denies any premonitory sensation or release with movements  Trial of  trihexyphenidyl with improvement but bid dosing caused him to feel "foggy"  MRI of the brain with very mild chronic mcirangiopathy       Interval hx:  Last injections with improvement only lasting 3 weeks or so  Blinking at its worse over the past week with it being frequent and bothersome  Exam: Frequent eye blinking bilaterally  No spasm  No muscle weakness  Involvement of the procerus  Will increase dose       Total  Injected: 32 5 units

## 2021-10-07 ENCOUNTER — TELEPHONE (OUTPATIENT)
Dept: NEUROLOGY | Facility: CLINIC | Age: 61
End: 2021-10-07

## 2021-11-11 ENCOUNTER — TELEPHONE (OUTPATIENT)
Dept: NEUROLOGY | Facility: CLINIC | Age: 61
End: 2021-11-11

## 2021-12-01 ENCOUNTER — PROCEDURE VISIT (OUTPATIENT)
Dept: NEUROLOGY | Facility: CLINIC | Age: 61
End: 2021-12-01
Payer: COMMERCIAL

## 2021-12-01 VITALS — TEMPERATURE: 97.8 F | SYSTOLIC BLOOD PRESSURE: 126 MMHG | DIASTOLIC BLOOD PRESSURE: 72 MMHG | HEART RATE: 74 BPM

## 2021-12-01 DIAGNOSIS — G24.5 BLEPHAROSPASM OF BOTH EYES: Primary | ICD-10-CM

## 2021-12-01 PROCEDURE — 64612 DESTROY NERVE FACE MUSCLE: CPT | Performed by: PSYCHIATRY & NEUROLOGY

## 2021-12-01 RX ORDER — TRIHEXYPHENIDYL HYDROCHLORIDE 2 MG/1
TABLET ORAL
Qty: 30 TABLET | Refills: 5 | Status: SHIPPED | OUTPATIENT
Start: 2021-12-01 | End: 2022-06-06

## 2022-01-27 ENCOUNTER — TELEPHONE (OUTPATIENT)
Dept: NEUROLOGY | Facility: CLINIC | Age: 62
End: 2022-01-27

## 2022-02-02 NOTE — TELEPHONE ENCOUNTER
Botox: Approve  100 units  Auth: [id number]  EFF: 02/01/2022 until 02/01/2023    4 visit    Please use [place]

## 2022-02-08 NOTE — TELEPHONE ENCOUNTER
John Birmingham spoke with [facility] the rep, I told her I was calling to check the status his benefits verification  As per [facility] patient is still going thru verification  I will call back on Thursday to check the status

## 2022-02-16 NOTE — TELEPHONE ENCOUNTER
Call [facility] spoke with Emma Peters, the rep; they are waiting for the pharmacy to release the shipping claim

## 2022-02-21 NOTE — TELEPHONE ENCOUNTER
Botox:  100 units is schedule for delivery on 02/24/2022    VIA: [employer] Priority    Please inform of delivery

## 2022-02-24 NOTE — TELEPHONE ENCOUNTER
Botox number of units: 100  Botox quantity: 1  Arrived at what location: [facility]  Botox at Correct Administering Location: Yes  NDC number: 3103-2551-18  Lot number: [id number]  Expiration Date: 05/31/2024  Appt notes indicate correct medication: Yes

## 2022-03-09 ENCOUNTER — PROCEDURE VISIT (OUTPATIENT)
Dept: NEUROLOGY | Facility: CLINIC | Age: 62
End: 2022-03-09
Payer: COMMERCIAL

## 2022-03-09 VITALS — SYSTOLIC BLOOD PRESSURE: 113 MMHG | TEMPERATURE: 97.9 F | HEART RATE: 68 BPM | DIASTOLIC BLOOD PRESSURE: 64 MMHG

## 2022-03-09 DIAGNOSIS — G24.5 BLEPHAROSPASM OF BOTH EYES: Primary | ICD-10-CM

## 2022-03-09 PROCEDURE — 64612 DESTROY NERVE FACE MUSCLE: CPT | Performed by: PSYCHIATRY & NEUROLOGY

## 2022-03-09 NOTE — PROGRESS NOTES
Universal Protocol   Consent: Written consent obtained  Consent given by: patient        Chemodenervation     Date/Time 3/9/2022 8:46 AM     Performed by  Margarita Craven MD     Authorized by Margarita Craven MD      Anesthesia  (see MAR for exact dosages): Anesthesia method:  None   Procedure details     Position:  Supine   Botox     Brand:  Botox    mL's of preservative free sterile saline:  2    Final Concentration per CC:  50 units    Needle gauge: 30G 1/2 inch    Procedures     Botox Procedures: blepharospasm      Indications: blepharospasm      Date of last injection:  12/1/2021   Post-procedure details      Chemodenervation:  Head or face    Facial Nerve Location[de-identified]  Bilateral facial nerve    Patient tolerance of procedure: Tolerated well, no immediate complications   Comments             Mr Maggie Espinoza 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 who present for Botox injections  To review, he gradually developed frequent eye blinking and spasms in April/May 2020 which worsened to the point of being present much of the day  Fluoextine for 18 years for OCD has been controlled  Dx in his 29's  He has "night tremors" since 2019  Left hand tremor on occasion while driving  Family history of "undiagnosed tics" described as some with eye twiching ( cousins and uncle)  He can suppress blinking for short periods of time if he really tries to concentrate on this  There is no history of frequent blinking or motor tics as a child  No sensory tricks  No photophobia  He denies any premonitory sensation or release with movements  Trial of  trihexyphenidyl with improvement but bid dosing caused him to feel "foggy"  MRI of the brain with very mild chronic mcirangiopathy       Interval hx:  Prior: 1st injections with improvement but did not last as long  When increased he had weakness with trouble keeping eyes open so dose reduced Dec 2021  No side effects with last dose  He is also now on trihexyphenidyl which he feels helps      Exam: Frequent eye blinking bilaterally  No spasm  No muscle weakness     Injections:  Right obicularis  -  10 units (1 25units x2 into upper lid, 2 5 units x 3 into lower lid and lateral lid)  Left obicularis - 10 units (1 25units x2 into upper lid, 2 5 units x 3 into lower lid and lateral lid)  Right - 1 25units x 1  Left  - 1 25 units x 1     Total injected: 22 5 units  Discarded: 77 5 units      NEXT VISIT will be delayed to determine the effect of Botox

## 2022-06-01 NOTE — TELEPHONE ENCOUNTER
Botox number of units: 100 units  Botox quantity: One (100u vial)  Arrived at what location: [facility]  Botox at Correct Administering Location: Yes  NDC number: 0864-6390-40  Lot number: [id number]  Expiration Date: 09/2023  Appt notes indicate correct medication: Yes

## 2022-06-04 DIAGNOSIS — G24.5 BLEPHAROSPASM OF BOTH EYES: ICD-10-CM

## 2022-06-06 RX ORDER — TRIHEXYPHENIDYL HYDROCHLORIDE 2 MG/1
TABLET ORAL
Qty: 30 TABLET | Refills: 5 | Status: SHIPPED | OUTPATIENT
Start: 2022-06-06 | End: 2022-06-08 | Stop reason: SDUPTHER

## 2022-06-08 DIAGNOSIS — G24.5 BLEPHAROSPASM OF BOTH EYES: ICD-10-CM

## 2022-06-08 RX ORDER — TRIHEXYPHENIDYL HYDROCHLORIDE 2 MG/1
TABLET ORAL
Qty: 30 TABLET | Refills: 5 | Status: SHIPPED | OUTPATIENT
Start: 2022-06-08 | End: 2022-07-25 | Stop reason: SDUPTHER

## 2022-06-28 ENCOUNTER — TELEPHONE (OUTPATIENT)
Dept: NEUROLOGY | Facility: CLINIC | Age: 62
End: 2022-06-28

## 2022-06-28 NOTE — TELEPHONE ENCOUNTER
Called pt and spoke to Clifford Avery in regards to r/s his upcoming Rocio Muster as the provider will not be in the office  I then offered the two soonest available appts and he agreed to the appt on 07/25/2022 in [facility] at 7:30 AM as the other date did not work for him  Patient was glad we called and got this appt scheduled

## 2022-07-18 ENCOUNTER — TELEPHONE (OUTPATIENT)
Dept: NEUROLOGY | Facility: CLINIC | Age: 62
End: 2022-07-18

## 2022-07-18 NOTE — TELEPHONE ENCOUNTER
Patient had an appointment scheduled for 7/13 at the [facility] office  Appointment was cancelled and re-scheduled for 7/25 at the [place] office  Reached out to Thea Lees at the [place] office to assist with having medication brought to the [place] office by [facility]

## 2022-07-18 NOTE — TELEPHONE ENCOUNTER
Botox has Arrived from [place] office to the [place] office      Botox number of units: 100 units  Botox quantity: One (100 unit vial)  Arrived at what location: [facility]   Botox at Correct Administering Location: Yes  NDC number: 2662-4842-72  Lot number: [id number]  Expiration Date: 09/2023  Appt notes indicate correct medication: Yes

## 2022-07-25 ENCOUNTER — PROCEDURE VISIT (OUTPATIENT)
Dept: NEUROLOGY | Facility: CLINIC | Age: 62
End: 2022-07-25
Payer: COMMERCIAL

## 2022-07-25 VITALS — TEMPERATURE: 97.6 F | SYSTOLIC BLOOD PRESSURE: 116 MMHG | DIASTOLIC BLOOD PRESSURE: 68 MMHG | HEART RATE: 67 BPM

## 2022-07-25 DIAGNOSIS — G24.5 BLEPHAROSPASM OF BOTH EYES: Primary | ICD-10-CM

## 2022-07-25 PROCEDURE — 64612 DESTROY NERVE FACE MUSCLE: CPT | Performed by: PSYCHIATRY & NEUROLOGY

## 2022-07-25 RX ORDER — TRIHEXYPHENIDYL HYDROCHLORIDE 2 MG/1
TABLET ORAL
Qty: 90 TABLET | Refills: 2 | Status: SHIPPED | OUTPATIENT
Start: 2022-07-25

## 2022-07-25 NOTE — PROGRESS NOTES
Universal Protocol   Consent: Written consent obtained  Consent given by: patient  Patient identity confirmed: verbally with patient        Chemodenervation     Date/Time 7/25/2022 6:27 AM     Performed by  Wesley Kang MD     Authorized by Wesley Kang MD        Pre-procedure details      Prepped With: Alcohol     Anesthesia  (see MAR for exact dosages): Anesthesia method:  None   Procedure details     Position:  Supine   Botox     Brand:  Botox    mL's of preservative free sterile saline:  2    Final Concentration per CC:  50 units    Needle gauge: 30G 1/2 inch  Procedures     Botox Procedures: blepharospasm      Indications: blepharospasm      Date of last injection:  3/9/2022   Post-procedure details      Chemodenervation:  Head or face    Facial Nerve Location[de-identified]  Bilateral facial nerve   Comments      Mr Helio Winter 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 who present for Botox injections  To review, he gradually developed frequent eye blinking and spasms in April/May 2020  On Fluoextine for 18 years for OCD  [facility] Dx in his 29's  He has "night tremors" since 2019  Left hand tremor on occasion while driving  Family history of "undiagnosed tics" described as some with eye twiching ( cousins and uncle)  He can suppress blinking for short periods of time if he really tries to concentrate on this  There is no history of frequent blinking or motor tics as a child  No sensory tricks  No photophobia  He denies any premonitory sensation or release with movements  Trial of  trihexyphenidyl with improvement  Increased dilution to 4cc per 100 units associated with increase lid weakness         Interval hx: Longer between injections today  Symptoms are worse  Return of increased blinking which can lead to facial movements to control blinking  Prior: 1st injections with improvement but did not last as long   When increased he had weakness with trouble keeping eyes open so dose reduced Dec 2021        Exam: Frequent eye blinking bilaterally  No spasm  No muscle weakness     Injections:  Right obicularis  -  10 units (1 25units x2 into upper lid, 2 5 units x 3 into lower lid and lateral )  Left obicularis - 10 units (1 25units x2 into upper lid, 2 5 units x 3 into lower lid and lateral l)  Right - 1 25units x 1  Left  - 1 25 units x 1     Total injected: 22 5 units  Discarded: 77 5 units

## 2022-09-28 ENCOUNTER — TELEPHONE (OUTPATIENT)
Dept: NEUROLOGY | Facility: CLINIC | Age: 62
End: 2022-09-28

## 2022-09-29 NOTE — TELEPHONE ENCOUNTER
Fax received from [facility]  with delivery information for:    Botox: 100 units   Qty:1  Delivery to [facility]  Scheduled: 10/5/2022    Please advise if medication does not arrive

## 2022-10-05 NOTE — TELEPHONE ENCOUNTER
Botox number of units: 100 units  Botox quantity: One (100u vial)  Arrived at what location: [facility]  Botox at Correct Administering Location: Yes  NDC number: 0827-3999-77  Lot number: [id number]  Expiration Date: 08/2024  Appt notes indicate correct medication: Yes

## 2022-10-10 ENCOUNTER — TELEPHONE (OUTPATIENT)
Dept: NEUROLOGY | Facility: CLINIC | Age: 62
End: 2022-10-10

## 2022-10-10 NOTE — TELEPHONE ENCOUNTER
Per Pedro Salvador, offer 11/16 at 7:30 am in [facility] for next botox appointment  Called and spoke with patient to schedule the appointment  Offered patient 11/16 at 7:30 am  Patient accepted the appointment and is now scheduled

## 2022-11-16 ENCOUNTER — PROCEDURE VISIT (OUTPATIENT)
Dept: NEUROLOGY | Facility: CLINIC | Age: 62
End: 2022-11-16

## 2022-11-16 VITALS — HEART RATE: 70 BPM | SYSTOLIC BLOOD PRESSURE: 116 MMHG | TEMPERATURE: 98.4 F | DIASTOLIC BLOOD PRESSURE: 60 MMHG

## 2022-11-16 DIAGNOSIS — G24.5 BLEPHAROSPASM OF BOTH EYES: Primary | ICD-10-CM

## 2022-11-16 RX ORDER — TRIHEXYPHENIDYL HYDROCHLORIDE 2 MG/1
TABLET ORAL
Qty: 90 TABLET | Refills: 2 | Status: SHIPPED | OUTPATIENT
Start: 2022-11-16

## 2022-11-16 RX ORDER — TRIHEXYPHENIDYL HYDROCHLORIDE 2 MG/1
2 TABLET ORAL DAILY
Qty: 14 TABLET | Refills: 0 | Status: SHIPPED | OUTPATIENT
Start: 2022-11-16
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
## 2023-01-18 ENCOUNTER — TELEPHONE (OUTPATIENT)
Dept: NEUROLOGY | Facility: CLINIC | Age: 63
End: 2023-01-18

## 2023-01-18 NOTE — TELEPHONE ENCOUNTER
Tobin Zapata from [facility] left VM re: current PA running out 23  Transcribed VM:   Good afternoon, this is Ty Bullard calling from [facility] call is being monitored to recording  And this is regarding a patient  The 1st name is Rai Begin  Last name is E L C [facility]  Date of birth is [birth year]  And this is regarding the Botox i was calling to inform you that you that the current authorization that we have on file will  to 2023  And send a new PA to [employer] plan, which is [place] [facility] [place]  And their contact number is [phone]  And be sure to list [facility] as the dispensing pharmacy  And once the PA has been approved you can fax it [facility] at [phone]  If you have any additional questions, give us a call back at [phone], Monday to Friday from 7 AM to 10 PM and  from 7 AM to 4 PM Central Standard times  Again, this is Ty Bullard calling from [employer], prior authorization department  Thank you

## 2023-01-26 ENCOUNTER — TELEPHONE (OUTPATIENT)
Dept: NEUROLOGY | Facility: CLINIC | Age: 63
End: 2023-01-26

## 2023-01-26 NOTE — TELEPHONE ENCOUNTER
Paris alfaro:      Good afternoon, this is tie chen and I'm calling from a credit, especially pharmacy call is being monitored, recorded, and disregarded patients  First name is Jannet Desai, last name is KIANNA SANDOVAL H E R T  Date of birth is [birth year]  And in reference to the botox, the authorization that we have on file would  to 2023  Please submit to the patients insurance plan is [employer], [employer]   Be sure it lists [facility] as the dispensing pharmacy  , and you can submit that PA  through The patients insurance plan, or a contact number is [phone], If you have any additional questions, you can give us a call back at [phone], monday to Friday from 7 AM to 10 PM and  from 7 AM to 4 PM Central Standard Time  Again, this is ty chen and I am calling from [employer] especially [facility], the prior authorization department  Thank you

## 2023-02-08 ENCOUNTER — TELEPHONE (OUTPATIENT)
Dept: NEUROLOGY | Facility: CLINIC | Age: 63
End: 2023-02-08

## 2023-02-08 NOTE — TELEPHONE ENCOUNTER
Submitted the following Prior-Authorization request via [employer]Meds through [facility] for:     Botox-100 units, [id number]  DX: G24 5, Blepharospasm of both eyes    Chacha MARTINES Case ID: [id number]     Awaiting approval/denial response      Will follow up on the status of pending authorization requested

## 2023-02-10 NOTE — TELEPHONE ENCOUNTER
Luis Eduardo Lezama at: Ph# [phone] Spoke to Kassi Santo in the [facility] Department regarding the Prior-Auth for Botox that was requested that I have not yet received a status on  Kassi Santo provided me with the following authorization info:    Approved:   Botox-100 units   DX: G24 5  Valid: 02/10/2023 until 08/14/2023  2 visits  Ref# [id number]     Please use [facility]       Kassi Santo will also be faxing me a copy of the Approved Botox Authorization for our records per my request

## 2023-02-14 ENCOUNTER — TELEPHONE (OUTPATIENT)
Dept: NEUROLOGY | Facility: CLINIC | Age: 63
End: 2023-02-14

## 2023-03-08 ENCOUNTER — TELEPHONE (OUTPATIENT)
Dept: NEUROLOGY | Facility: CLINIC | Age: 63
End: 2023-03-08

## 2023-03-08 NOTE — TELEPHONE ENCOUNTER
Called pt and spoke to Salvatore in regards to scheduling his [facility] as we are expecting his botox to arrive tomorrow 03/09/2023  I then offered the soonest available appt which is on 03/23/2023 in the [facility] office at 7:30 AM  Patient agreed to this appt date, time and location and is scheduled

## 2023-03-23 ENCOUNTER — PROCEDURE VISIT (OUTPATIENT)
Dept: NEUROLOGY | Facility: CLINIC | Age: 63
End: 2023-03-23

## 2023-03-23 VITALS — TEMPERATURE: 98.6 F | SYSTOLIC BLOOD PRESSURE: 130 MMHG | DIASTOLIC BLOOD PRESSURE: 78 MMHG | HEART RATE: 70 BPM

## 2023-03-23 DIAGNOSIS — G24.5 BLEPHAROSPASM OF BOTH EYES: Primary | ICD-10-CM

## 2023-03-23 NOTE — PROGRESS NOTES
Universal Protocol   Consent: Written consent obtained  Consent given by: patient        Chemodenervation     Date/Time 3/23/2023 7:05 AM     Performed by  Cassandra Page MD     Authorized by Ruth Sahni MD        Pre-procedure details      Prepped With: Alcohol     Anesthesia  (see MAR for exact dosages): Anesthesia method:  None   Procedure details     Position:  Supine and upright   Botox     Brand:  Botox    mL's of preservative free sterile saline:  2    Final Concentration per CC:  50 units    Needle gauge: 301/2 inch    Procedures     Botox Procedures: blepharospasm      Indications: blepharospasm      Date of last injection:  11/16/2022   Post-procedure details      Chemodenervation:  Head or face    Facial Nerve Location[de-identified]  Bilateral facial nerve   Comments                        Mr Sky Galeas 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 since April/May 2020  On Fluoextine for 18 years for OCD  Dx in his 29's  Left hand tremor on occasion while driving  Family history of "undiagnosed tics" described as some with eye twiching ( cousins and uncle)  No sensory tricks  He denies any premonitory sensation or release with movements  Trial of  trihexyphenidyl with improvement  Increased dilution to 4cc per 100 units associated with increase lid weakness          Interval hx: Return of increased blinking which can lead to facial movements to control blinking  Last injections lasted about a month  He has photophobia , worse with florescent lights  No sensory trick  He remains on trihexyphenidyl 2mg daily       Prior: 1st injections with improvement but did not last as long  When increased he had weakness with trouble keeping eyes open so dose reduced Dec 2021        Exam: Frequent eye blinking bilaterally  No spasm  No muscle weakness     Injections:  Right obicularis  -  12 5 units (2 5units x2 into upper lid, 2 5 units x 3 into lower lid and lateral )  Left obicularis - 12 5 units (2 5units x2 into upper lid, 2 5 units x 3 into lower lid and lateral l)  Right - 2 5units x 1  Left  - 2 5 units x 1     Total injected: 30 units  Discarded:  70 units

## 2023-06-09 ENCOUNTER — TELEPHONE (OUTPATIENT)
Dept: NEUROLOGY | Facility: CLINIC | Age: 63
End: 2023-06-09

## 2023-06-09 NOTE — TELEPHONE ENCOUNTER
Contacted [facility] chat, spoke with rep, Deepika Milan who provided the following details:    Botox 200 UNITS    Qty  1    Scheduled: 6/13/2023    Location: [facility]    Via:     Ups/Fedex     Please advise if Botox does not arrive  Rocio is here today for her annual gyne exam.  Denies known Latex allergy or symptoms of Latex sensitivity.  Patient was given the option of a chaperone and declines.  Medications verified, no changes.

## 2023-06-15 NOTE — TELEPHONE ENCOUNTER
Botox number of units: 100 Units  Botox quantity: One (100u vial)  Arrived at what location: [facility]  Botox at Correct Administering Location: Yes  NDC number: 9990-2477-90  Lot number: [id number]  Expiration Date: 10/2025  Appt notes indicate correct medication: Yes

## 2023-07-06 ENCOUNTER — PROCEDURE VISIT (OUTPATIENT)
Dept: NEUROLOGY | Facility: CLINIC | Age: 63
End: 2023-07-06
Payer: COMMERCIAL

## 2023-07-06 VITALS — TEMPERATURE: 98.7 F | DIASTOLIC BLOOD PRESSURE: 77 MMHG | SYSTOLIC BLOOD PRESSURE: 129 MMHG

## 2023-07-06 DIAGNOSIS — I99.8 TRANSIENT ISCHEMIA: ICD-10-CM

## 2023-07-06 DIAGNOSIS — G24.5 BLEPHAROSPASM OF BOTH EYES: Primary | ICD-10-CM

## 2023-07-06 PROCEDURE — 64612 DESTROY NERVE FACE MUSCLE: CPT | Performed by: PSYCHIATRY & NEUROLOGY

## 2023-07-06 PROCEDURE — 99212 OFFICE O/P EST SF 10 MIN: CPT | Performed by: PSYCHIATRY & NEUROLOGY
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
## 2023-07-06 NOTE — PROGRESS NOTES
Universal Protocol   Consent: Written consent obtained. Consent given by: patient        Chemodenervation     Date/Time 7/6/2023 7:30 AM     Performed by  Anna Mckee MD   Authorized by Anna Mckee MD       Pre-procedure details      Prepped With: Alcohol     Anesthesia  (see MAR for exact dosages): Anesthesia method:  None   Procedure details     Position:  Upright   Botox     Brand:  Botox    mL's of preservative free sterile saline:  2    Final Concentration per CC:  50 units    Needle gauge: 30G 1/2 inch. Procedures     Botox Procedures: blepharospasm      Indications: blepharospasm      Date of last injection:  3/23/2023   Post-procedure details      Chemodenervation:  Head or face    Facial Nerve Location[de-identified]  Bilateral facial nerve    Patient tolerance of procedure: Tolerated well, no immediate complications   Comments                         Mr. Mauricio Meckel 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 since April/May 2020. On Fluoextine for 18 years for OCD. Dx in his 29's. Left hand tremor on occasion while driving. Family history of "undiagnosed tics" described as some with eye twiching ( cousins and uncle). No sensory tricks. He denies any premonitory sensation or release with movements. Trial of  trihexyphenidyl with improvement. Increased dilution to 4cc per 100 units associated with increase lid weakness.         Interval hx: Return of increased blinking which can lead to facial movements to control blinking. Last injections lasted about a month. He has photophobia , worse with florescent lights. No sensory trick. He remains on trihexyphenidyl 2mg daily.      Prior: 1st injections with improvement but did not last as long. When increased he had weakness with trouble keeping eyes open so dose reduced Dec 2021.       Exam: Frequent eye blinking bilaterally. No spasm. No muscle weakness.      Injections:  Right obicularis  -  12.5 units (2.5units x2 into upper lid, 2.5 units x 3 into lower lid and lateral )  Left obicularis - 12.5 units (2.5units x2 into upper lid, 2.5 units x 3 into lower lid and lateral l)  Right - 2.5units x 1  Left  - 2.5 units x 1     Total injected: 30 units  Discarded:  70 units

## 2023-07-06 NOTE — ASSESSMENT & PLAN NOTE
Transient episodes of right eye ptosis and right distal upper extremity weakness for which he underwent stroke workup which was unrevealing. Thought in hospital to be unlikely stroke/ TIA given length of symptoms. It appears ptosis was noted by patient about a week but hand weakness noted by PCP day of. Question ptosis being related to neurotoxin although patient does not feel this is the case. Transient hand weakness could be TIA. Agree with continuing aspirin and atorvastatin. Lipids normal. LDL 72  BP goal < 130/80.  Currently normal.

## 2023-07-31 DIAGNOSIS — G24.5 BLEPHAROSPASM OF BOTH EYES: Primary | ICD-10-CM

## 2023-08-01 RX ORDER — TRIHEXYPHENIDYL HYDROCHLORIDE 2 MG/1
2 TABLET ORAL DAILY
Qty: 90 TABLET | Refills: 6 | Status: SHIPPED | OUTPATIENT
Start: 2023-08-01 | End: 2023-08-02 | Stop reason: SDUPTHER

## 2023-08-02 DIAGNOSIS — G24.5 BLEPHAROSPASM OF BOTH EYES: Primary | ICD-10-CM

## 2023-08-02 RX ORDER — TRIHEXYPHENIDYL HYDROCHLORIDE 2 MG/1
2 TABLET ORAL DAILY
Qty: 90 TABLET | Refills: 2 | Status: SHIPPED | OUTPATIENT
Start: 2023-08-02

## 2023-10-19 ENCOUNTER — PROCEDURE VISIT (OUTPATIENT)
Dept: NEUROLOGY | Facility: CLINIC | Age: 63
End: 2023-10-19
Payer: COMMERCIAL

## 2023-10-19 VITALS — HEART RATE: 66 BPM | TEMPERATURE: 96.5 F | DIASTOLIC BLOOD PRESSURE: 78 MMHG | SYSTOLIC BLOOD PRESSURE: 130 MMHG

## 2023-10-19 DIAGNOSIS — G24.5 BLEPHAROSPASM OF BOTH EYES: Primary | ICD-10-CM

## 2023-10-19 DIAGNOSIS — R55 NEAR SYNCOPE: ICD-10-CM

## 2023-10-19 PROCEDURE — 64612 DESTROY NERVE FACE MUSCLE: CPT | Performed by: PSYCHIATRY & NEUROLOGY

## 2023-10-19 PROCEDURE — 99213 OFFICE O/P EST LOW 20 MIN: CPT | Performed by: PSYCHIATRY & NEUROLOGY

## 2023-10-19 NOTE — ASSESSMENT & PLAN NOTE
Transient episode of elevated heart rate, sweating, and dizziness. By history not suggestive of vertigo. Prior stroke work up from April reviewed. No focal neurological deficits on today's exam.     Recommended he see his PCP for further evaluation and possible cardiac workup.
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
## 2024-01-10 ENCOUNTER — TELEPHONE (OUTPATIENT)
Dept: NEUROLOGY | Facility: CLINIC | Age: 64
End: 2024-01-10

## 2024-01-10 NOTE — TELEPHONE ENCOUNTER
Reached out to [facility] chat, spoke with Gianfranco weiner, and obtained the following delivery details:    Botox 100 UNITS    Qty.1    Scheduled: 1/11/2024    Location: [facility]    Via:     Ups/Fedex     Please advise if Botox does not arrive.

## 2024-01-18 ENCOUNTER — PROCEDURE VISIT (OUTPATIENT)
Dept: NEUROLOGY | Facility: CLINIC | Age: 64
End: 2024-01-18
Payer: COMMERCIAL

## 2024-01-18 VITALS — DIASTOLIC BLOOD PRESSURE: 80 MMHG | TEMPERATURE: 97.9 F | HEART RATE: 66 BPM | SYSTOLIC BLOOD PRESSURE: 132 MMHG

## 2024-01-18 DIAGNOSIS — G24.5 BLEPHAROSPASM OF BOTH EYES: Primary | ICD-10-CM

## 2024-01-18 PROCEDURE — 64612 DESTROY NERVE FACE MUSCLE: CPT | Performed by: PSYCHIATRY & NEUROLOGY

## 2024-01-18 RX ORDER — TRIHEXYPHENIDYL HYDROCHLORIDE 2 MG/1
2 TABLET ORAL DAILY
Qty: 90 TABLET | Refills: 2 | Status: SHIPPED | OUTPATIENT
Start: 2024-01-18

## 2024-01-18 NOTE — PROGRESS NOTES
"Universal Protocol   Consent: Written consent obtained.  Consent given by: patient      Chemodenervation     Date/Time  1/18/2024 8:00 AM     Performed by  Krysta Sahni MD   Authorized by  Krysta Sahni MD     Pre-procedure details      Prepped With: Alcohol     Anesthesia  (see MAR for exact dosages):     Anesthesia method:  None   Procedure details      Position:  Upright   Botox      Brand:  Botox    Final Concentration per CC:  50 units (2cc into 100 units)    Needle gauge: 30G 1/2 inch.   Procedures      Botox Procedures: blepharospasm      Indications: blepharospasm      Date of last injection:  10/19/2023   Post-procedure details      Chemodenervation:  Head or face    Facial Nerve Location::  Bilateral facial nerve    Patient tolerance of procedure:  Tolerated well, no immediate complications   Comments       Patient 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 since April/May 2020. On Fluoextine for 18 years for OCD. Dx in his 30's. Left hand tremor on occasion while driving. Family history of \"undiagnosed tics\" described as some with eye twiching ( cousins and uncle). No sensory tricks. He denies any premonitory sensation or release with movements.  Trial of  trihexyphenidyl with improvement. Increased dilution to 4cc per 100 units associated with increase lid weakness.         Interval hx: Return of increased blinking which can lead to facial movements to control blinking. Last injections lasted about a month. He has photophobia , worse with florescent lights.  No sensory trick. He remains on trihexyphenidyl 2mg daily.      Prior: 1st injections with improvement but did not last as long. When increased he had weakness with trouble keeping eyes open so dose reduced Dec 2021.   Interval: Good response. Return of blinking 1-2 weeks cassius.        Exam: Frequent eye blinking bilaterally. No spasm. No muscle weakness.      Injections:  Right obicularis  -  12.5 units (2.5units x2 into upper lid, 2.5 units x 3 " into lower lid and lateral )  Left obicularis - 12.5 units (2.5units x2 into upper lid, 2.5 units x 3 into lower lid and lateral l)  Right - 2.5units x 1  Left  - 2.5 units x 1     Total injected: 30 units  Discarded:  70 units

## 2024-03-21 ENCOUNTER — TELEPHONE (OUTPATIENT)
Dept: NEUROLOGY | Facility: CLINIC | Age: 64
End: 2024-03-21

## 2024-03-21 NOTE — TELEPHONE ENCOUNTER
Initiated joshua wang, however, karih cannot be sent as pt needs new office visit for determination. Pt's last office visit was 4/28/2021. Please schedule pt for an office visit. Once office visit is complete, please inform so that auth can be sent for determination. Thank you

## 2024-04-18 ENCOUNTER — OFFICE VISIT (OUTPATIENT)
Dept: NEUROLOGY | Facility: CLINIC | Age: 64
End: 2024-04-18
Payer: COMMERCIAL

## 2024-04-18 VITALS
DIASTOLIC BLOOD PRESSURE: 70 MMHG | BODY MASS INDEX: 37.19 KG/M2 | HEART RATE: 86 BPM | WEIGHT: 206.6 LBS | SYSTOLIC BLOOD PRESSURE: 114 MMHG

## 2024-04-18 DIAGNOSIS — G24.5 BLEPHAROSPASM OF BOTH EYES: Primary | ICD-10-CM

## 2024-04-18 PROCEDURE — 99213 OFFICE O/P EST LOW 20 MIN: CPT | Performed by: PSYCHIATRY & NEUROLOGY

## 2024-04-18 RX ORDER — TRIHEXYPHENIDYL HYDROCHLORIDE 2 MG/1
2 TABLET ORAL DAILY
Qty: 90 TABLET | Refills: 2 | Status: SHIPPED | OUTPATIENT
Start: 2024-04-18

## 2024-04-18 RX ORDER — OMEPRAZOLE 40 MG/1
40 CAPSULE, DELAYED RELEASE ORAL 2 TIMES DAILY
COMMUNITY
Start: 2024-03-26 | End: 2024-06-24

## 2024-04-18 NOTE — PROGRESS NOTES
Patient ID: Naren Pope is a 63 y.o. male.    Assessment/Plan:    Blepharospasm of both eyes  Patient with blepharospasm which responds greatly to neurotoxin injections given every 3 months.  He has tried and failed multiple medications.  He does remain on low-dose trihexyphenidyl which adds in treatment but does not completely control symptoms.  Injections tend to wear off towards the end of 3 months.    Recommend continued neurotoxin injections with Botox every 3 months to muscles of the eyes.  No adverse effects.       Diagnoses and all orders for this visit:    Blepharospasm of both eyes  -     trihexyphenidyl (ARTANE) 2 mg tablet; Take 1 tablet (2 mg total) by mouth daily    Other orders  -     omeprazole (PriLOSEC) 40 MG capsule; Take 40 mg by mouth 2 (two) times a day  -     Famotidine (PEPCID PO); Take by mouth       Subjective:    Mr. Pope 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 since April/May 2020, who presents today for follow up.     He has longstanding history of blepharospasm with good results following injections.  He has been receiving Botox injections every 3 months with good relief of symptoms within  1-2 days of injection.  Since last close to 3 months.   Also on trihexyphenidyl which has been noted to improve his movement as well.  Increased blinking started about a week and a half ago.  He denies any associated spasms.  He does have photophobia.       Prior imaging reviewed today.    MRA head and neck from 4/2023- Widely patent intracranial and extracranial arterial circulation.  MRI brain - nonspecific white matter changes.      Objective:    Blood pressure 114/70, pulse 86, weight 93.7 kg (206 lb 9.6 oz).    Physical Exam  Vitals reviewed.   Eyes:      Pupils: Pupils are equal, round, and reactive to light.   Neurological:      Motor: Motor strength is normal.  Psychiatric:         Speech: Speech normal.         Neurological Exam  Mental Status   Oriented to person, place, time and situation.  Recent and remote memory are intact. Speech is normal. Follows complex commands. Attention and concentration are normal.    Cranial Nerves  CN II: EOMI on left  Right strabismus, incongruent movement (states present since youth) .  CN III, IV, VI: Chronic right eye ptosis. Pupils equal round and reactive to light bilaterally.  CN V: Facial sensation is normal.  CN VII: Full and symmetric facial movement.  CN VIII: Hearing is normal.  CN IX, X: Palate elevates symmetrically  CN XI: Shoulder shrug strength is normal.  CN XII: Tongue midline without atrophy or fasciculations.  Frequent bilateral eye blinking.  No spasms..    Motor   Normal muscle tone. Strength is 5/5 throughout all four extremities.    Coordination  Right: Finger-to-nose normal. Rapid alternating movement normal.Left: Finger-to-nose normal. Rapid alternating movement normal.    Gait  Casual gait is normal including stance, stride, and arm swing. Able to rise from chair without using arms.        ROS:    Review of Systems   Constitutional:  Negative for appetite change, fatigue and fever.   HENT: Negative.  Negative for hearing loss, tinnitus, trouble swallowing and voice change.    Eyes: Negative.  Negative for photophobia, pain and visual disturbance.   Respiratory: Negative.  Negative for shortness of breath.    Cardiovascular: Negative.  Negative for palpitations.   Gastrointestinal:  Negative for nausea and vomiting.        GERD   Endocrine: Negative.  Negative for cold intolerance.   Genitourinary: Negative.  Negative for dysuria, frequency and urgency.   Musculoskeletal:  Negative for back pain, gait problem, myalgias, neck pain and neck stiffness.   Skin: Negative.  Negative for rash.   Allergic/Immunologic: Negative.    Neurological: Negative.  Negative for dizziness, tremors, seizures, syncope, facial asymmetry, speech difficulty, weakness, light-headedness, numbness and headaches.   Hematological: Negative.  Does not bruise/bleed easily.    Psychiatric/Behavioral: Negative.  Negative for confusion, hallucinations and sleep disturbance.    All other systems reviewed and are negative.

## 2024-04-18 NOTE — ASSESSMENT & PLAN NOTE
Patient with blepharospasm which responds greatly to neurotoxin injections given every 3 months.  He has tried and failed multiple medications.  He does remain on low-dose trihexyphenidyl which adds in treatment but does not completely control symptoms.  Injections tend to wear off towards the end of 3 months.    Recommend continued neurotoxin injections with Botox every 3 months to muscles of the eyes.  No adverse effects.

## 2024-05-16 ENCOUNTER — TELEPHONE (OUTPATIENT)
Dept: NEUROLOGY | Facility: CLINIC | Age: 64
End: 2024-05-16

## 2024-05-16 NOTE — TELEPHONE ENCOUNTER
Patient called stated he had an approval # for botox and that he was due for his botox already.  Patient states his eyes are bothering him.  Please assist    Botox Approval from [facility]  [id number]

## 2024-05-22 ENCOUNTER — PROCEDURE VISIT (OUTPATIENT)
Dept: NEUROLOGY | Facility: CLINIC | Age: 64
End: 2024-05-22
Payer: COMMERCIAL

## 2024-05-22 VITALS — TEMPERATURE: 99.6 F | HEART RATE: 68 BPM | SYSTOLIC BLOOD PRESSURE: 134 MMHG | DIASTOLIC BLOOD PRESSURE: 78 MMHG

## 2024-05-22 DIAGNOSIS — G24.5 BLEPHAROSPASM OF BOTH EYES: Primary | ICD-10-CM

## 2024-05-22 PROCEDURE — 64612 DESTROY NERVE FACE MUSCLE: CPT | Performed by: PSYCHIATRY & NEUROLOGY

## 2024-05-22 NOTE — PROGRESS NOTES
"Universal Protocol   Consent: Written consent obtained.  Consent given by: patient      Chemodenervation     Date/Time  5/22/2024 11:00 AM     Performed by  Krysta Sahni MD   Authorized by  Krysta Sahni MD     Pre-procedure details      Prepped With: Alcohol     Anesthesia  (see MAR for exact dosages):     Anesthesia method:  None   Procedure details      Position:  Upright   Botox      Brand:  Botox    mL's of preservative free sterile saline:  2    Final Concentration per CC:  50 units    Needle gauge: 30G 0.5 inch.   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 On Fluoxetine for 18 years for OCD. Dx in his 30's. Left hand tremor on occasion while driving. Family history of \"undiagnosed tics\" described as some with eye twiching ( cousins and uncle). No sensory tricks. He denies any premonitory sensation or release with movements.  Trial of  trihexyphenidyl with improvement. Increased dilution to 4cc per 100 units associated with increase lid weakness.    Prior: 1st injections with improvement but did not last as long. When increased he had weakness with trouble keeping eyes open so dose reduced Dec 2021.     Interval:  Responds greatly. Wears off in 3 months.       Exam: Frequent eye blinking bilaterally. No spasm. No muscle weakness.      Injections:  Right obicularis  -  12.5 units (2.5units x5 into M/Lupper lid, M/L lower lid and lateral )  Left obicularis - 12.5 units (2.5units x5 into M/L upper lid,  M//Llower lid and lateral )  Right - 2.5units x 1  Left  - 2.5 units x 1     Total injected: 30 units  Discarded:  70 units               "

## 2024-06-25 NOTE — TELEPHONE ENCOUNTER
Patient received a bill for admin code [id number], email has been sent to [facility] in Billing 6/2/2021 to look into resubmitting patient's claim  Detail Level: Generalized Detail Level: Zone Detail Level: Detailed Prescription Strength Graduated Compression Stockings Recommendations: The patient was counseled that prescription strength graduated compression stockings should be worn for all waking hours. They will follow up with a venous specialist to monitor graduated compression stocking usage and their symptoms.

## 2024-08-29 ENCOUNTER — PROCEDURE VISIT (OUTPATIENT)
Dept: NEUROLOGY | Facility: CLINIC | Age: 64
End: 2024-08-29
Payer: COMMERCIAL

## 2024-08-29 VITALS — HEART RATE: 68 BPM | DIASTOLIC BLOOD PRESSURE: 68 MMHG | SYSTOLIC BLOOD PRESSURE: 116 MMHG | TEMPERATURE: 98.9 F

## 2024-08-29 DIAGNOSIS — G24.5 BLEPHAROSPASM OF BOTH EYES: Primary | ICD-10-CM

## 2024-08-29 PROCEDURE — 64612 DESTROY NERVE FACE MUSCLE: CPT | Performed by: PSYCHIATRY & NEUROLOGY

## 2024-08-29 NOTE — PROGRESS NOTES
"Universal Protocol   Consent: Written consent obtained.  Consent given by: patient      Chemodenervation     Date/Time  8/29/2024 3:00 PM     Performed by  Krysta Sahni MD   Authorized by  Krysta Sahni MD     Procedure details      Position:  Upright   Botox      Brand:  Botox   Procedures      Botox Procedures: blepharospasm      Indications: blepharospasm     Post-procedure details      Chemodenervation:  Head or face    Facial Nerve Location::  Bilateral facial nerve   Comments                Patient 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 since April/May 2020. On Fluoxetine for 18 years for OCD. Dx in his 30's. Left hand tremor on occasion while driving. Family history of \"undiagnosed tics\" described as some with eye twiching ( cousins and uncle). No sensory tricks. He denies any premonitory sensation or release with movements.  Trial of  trihexyphenidyl with improvement. Increased dilution to 4cc per 100 units associated with increase lid weakness.    Prior: 1st injections with improvement but did not last as long. When increased he had weakness with trouble keeping eyes open so dose reduced Dec 2021.      Interval:  Responds greatly. Wears off in 3 months.       Exam: Frequent eye blinking bilaterally. No spasm. No muscle weakness.      Injections:  Right obicularis  -  12.5 units (2.5units x5 into M/Lupper lid, M/L lower lid and lateral )  Left obicularis - 12.5 units (2.5units x5 into M/L upper lid,  M//Llower lid and lateral )  Right - 2.5units x 1  Left  - 2.5 units x 1     Total injected: 30 units  Discarded:  70 units                     "

## 2024-12-25 DIAGNOSIS — G24.5 BLEPHAROSPASM OF BOTH EYES: ICD-10-CM

## 2024-12-26 RX ORDER — TRIHEXYPHENIDYL HYDROCHLORIDE 2 MG/1
2 TABLET ORAL DAILY
Qty: 90 TABLET | Refills: 1 | Status: SHIPPED | OUTPATIENT
Start: 2024-12-26

## 2024-12-30 ENCOUNTER — TELEPHONE (OUTPATIENT)
Age: 64
End: 2024-12-30

## 2024-12-30 NOTE — TELEPHONE ENCOUNTER
Pt Calling in requesting a call in regards to getting botox appt rescheduled that he missed on 12//3/24

## 2025-01-08 NOTE — TELEPHONE ENCOUNTER
01/08/25    Patient Called Office today to check on the status of his BOTOX Appt being Reschedule.    Patient Missed his 12/04/24 due to Medical Reasons.    Patient stated that his Eye are bad and needs BOTOX Appt rescheduled ASAP.    Patient gave the OK to leave detailed message if not able to reach him, and that he can go to any office to have his BOTOX.       Please contact Patient with Appt.  Thank You.

## 2025-01-10 NOTE — TELEPHONE ENCOUNTER
01/10/25    Patient called office today to check on the status of his BOTOX Appt being Rescheduled.    Informed Patient that I will send High Priority Message to Dr. Cristian ARITA for assistance, and to give us a call back Next Week to check o the Status of Message.    I MADE NO PROMISES.    Patient UNDERSTOOD and AGREED.       Please Review and Contact Patient with any status.  Thank You.    My Apologies for the Request or Trouble.

## 2025-01-29 ENCOUNTER — PROCEDURE VISIT (OUTPATIENT)
Dept: NEUROLOGY | Facility: CLINIC | Age: 65
End: 2025-01-29
Payer: COMMERCIAL

## 2025-01-29 VITALS — TEMPERATURE: 97.9 F | DIASTOLIC BLOOD PRESSURE: 74 MMHG | SYSTOLIC BLOOD PRESSURE: 118 MMHG | HEART RATE: 68 BPM

## 2025-01-29 DIAGNOSIS — G24.5 BLEPHAROSPASM OF BOTH EYES: Primary | ICD-10-CM

## 2025-01-29 PROCEDURE — 64612 DESTROY NERVE FACE MUSCLE: CPT | Performed by: PSYCHIATRY & NEUROLOGY

## 2025-01-29 NOTE — PROGRESS NOTES
"Universal Protocol   Consent: Written consent obtained.  Consent given by: patient      Chemodenervation     Date/Time  1/29/2025 7:00 AM     Performed by  Krysta Sahni MD   Authorized by  Krysta Sahni MD     Pre-procedure details      Prepped With: Alcohol     Anesthesia  (see MAR for exact dosages):     Anesthesia method:  None   Procedure details      Position:  Upright   Botox      Brand:  Botox    mL's of preservative free sterile saline:  2    Final Concentration per CC:  50 units    Needle gauge: 30G 0.5inch.   Procedures      Botox Procedures: blepharospasm      Indications: blepharospasm      Date of last injection:  8/29/2024   Post-procedure details      Chemodenervation:  Head or face    Facial Nerve Location::  Bilateral facial nerve    Patient tolerance of procedure:  Tolerated well, no immediate complications   Comments         Patient 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 since April/May 2020. On Fluoxetine for 18 years for OCD. Dx in his 30's. Left hand tremor on occasion while driving. Family history of \"undiagnosed tics\" described as some with eye twiching ( cousins and uncle). No sensory tricks. He denies any premonitory sensation or release with movements.  Trial of  trihexyphenidyl with improvement. Increased dilution to 4cc per 100 units associated with increase lid weakness.    Prior: 1st injections with improvement but did not last as long. When increased he had weakness with trouble keeping eyes open so dose reduced Dec 2021.      Interval:  Good response. Last 3 months. Late for injection due to hospitalization.        Exam: Frequent eye blinking bilaterally. No spasm. No muscle weakness.      Injections:  Right obicularis  -  12.5 units (2.5units x5 into M/Lupper lid, M/L lower lid and lateral )  Left obicularis - 12.5 units (2.5units x5 into M/L upper lid,  M//Llower lid and lateral )  Right - 2.5units x 1  Left  - 2.5 units x 1     Total injected: 30 " units  Discarded:  70 units

## 2025-05-01 ENCOUNTER — PROCEDURE VISIT (OUTPATIENT)
Dept: NEUROLOGY | Facility: CLINIC | Age: 65
End: 2025-05-01
Payer: COMMERCIAL

## 2025-05-01 VITALS — TEMPERATURE: 97.9 F | SYSTOLIC BLOOD PRESSURE: 126 MMHG | DIASTOLIC BLOOD PRESSURE: 74 MMHG | HEART RATE: 60 BPM

## 2025-05-01 DIAGNOSIS — G24.5 BLEPHAROSPASM OF BOTH EYES: Primary | ICD-10-CM

## 2025-05-01 PROCEDURE — 64612 DESTROY NERVE FACE MUSCLE: CPT | Performed by: PSYCHIATRY & NEUROLOGY

## 2025-05-01 RX ORDER — TRIHEXYPHENIDYL HYDROCHLORIDE 2 MG/1
2 TABLET ORAL DAILY
Qty: 90 TABLET | Refills: 1 | Status: SHIPPED | OUTPATIENT
Start: 2025-05-01

## 2025-05-01 NOTE — PROGRESS NOTES
"Universal Protocol   Consent: Written consent obtained.  Consent given by: patient      Chemodenervation     Date/Time  5/1/2025 8:00 AM     Performed by  Krysta Sahni MD   Authorized by  Krysta Sahni MD     Pre-procedure details      Prepped With: Alcohol      Premedication:  Diazepam   Anesthesia  (see MAR for exact dosages):     Anesthesia method:  None   Procedure details      Position:  Upright   Botox      Brand:  Botox    mL's of preservative free sterile saline:  2    Final Concentration per CC:  50 units    Needle gauge: 30G 0.5 inch.   Procedures      Botox Procedures: blepharospasm      Indications: blepharospasm      Date of last injection:  1/29/2025   Comments       Patient 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 since April/May 2020. On Fluoxetine for 18 years for OCD. Dx in his 30's. Left hand tremor on occasion while driving. Family history of \"undiagnosed tics\" described as some with eye twiching ( cousins and uncle). No sensory tricks. He denies any premonitory sensation or release with movements.  Trial of  trihexyphenidyl with improvement. Increased dilution to 4cc per 100 units associated with increase lid weakness.    Prior: 1st injections with improvement but did not last as long. When increased he had weakness with trouble keeping eyes open so dose reduced Dec 2021.      Interval:  Good response. Last 3 months. Late for injection due to hospitalization.        Exam: Frequent eye blinking bilaterally. No spasm. No muscle weakness.      Injections:  Right obicularis  -  12.5 units (2.5units x5 into M/Lupper lid, M/L lower lid and lateral )  Left obicularis - 12.5 units (2.5units x5 into M/L upper lid,  M//Llower lid and lateral )  Right - 2.5units x 1  Left  - 2.5 units x 1     Total injected: 30 units  Discarded:  70 units               "

## 2025-07-02 ENCOUNTER — TELEPHONE (OUTPATIENT)
Dept: NEUROLOGY | Facility: CLINIC | Age: 65
End: 2025-07-02

## 2025-07-02 NOTE — TELEPHONE ENCOUNTER
Called patient again to try to schedule a re-auth appointment prior to his scheduled Botox appointment on 8/7/2025 with Dr. Foster. Patient needs a new re-auth asap. Patient did not answer, I left patient another voicemail with callback number.

## 2025-07-02 NOTE — TELEPHONE ENCOUNTER
Called patient to schedule a Re-auth appt prior to 8/7 botox appt. Patient did not answer, I left patient a voicemail with call back number.

## 2025-07-07 ENCOUNTER — TELEPHONE (OUTPATIENT)
Dept: NEUROLOGY | Facility: CLINIC | Age: 65
End: 2025-07-07

## 2025-07-07 NOTE — TELEPHONE ENCOUNTER
Patient called back to schedule his re-auth for Botox prior to his 8/7 appointment with Dr. Foster. Patient is scheduled for a Virtual Visit with Long Salazar on 07/15/2025.

## 2025-07-09 ENCOUNTER — TELEPHONE (OUTPATIENT)
Dept: NEUROLOGY | Facility: CLINIC | Age: 65
End: 2025-07-09

## 2025-07-15 ENCOUNTER — TELEMEDICINE (OUTPATIENT)
Dept: NEUROLOGY | Facility: CLINIC | Age: 65
End: 2025-07-15
Payer: COMMERCIAL

## 2025-07-15 DIAGNOSIS — G24.5 BLEPHAROSPASM OF BOTH EYES: ICD-10-CM

## 2025-07-15 PROCEDURE — 99212 OFFICE O/P EST SF 10 MIN: CPT | Performed by: PHYSICIAN ASSISTANT

## 2025-07-15 RX ORDER — TRIHEXYPHENIDYL HYDROCHLORIDE 2 MG/1
2 TABLET ORAL DAILY
Qty: 90 TABLET | Refills: 3 | Status: SHIPPED | OUTPATIENT
Start: 2025-07-15

## 2025-08-04 ENCOUNTER — TELEPHONE (OUTPATIENT)
Age: 65
End: 2025-08-04

## 2025-08-06 ENCOUNTER — TELEPHONE (OUTPATIENT)
Dept: NEUROLOGY | Facility: CLINIC | Age: 65
End: 2025-08-06